# Patient Record
Sex: FEMALE | Race: WHITE | NOT HISPANIC OR LATINO | URBAN - METROPOLITAN AREA
[De-identification: names, ages, dates, MRNs, and addresses within clinical notes are randomized per-mention and may not be internally consistent; named-entity substitution may affect disease eponyms.]

---

## 2020-06-04 ENCOUNTER — INPATIENT (INPATIENT)
Facility: HOSPITAL | Age: 70
LOS: 3 days | Discharge: HOME | DRG: 175 | End: 2020-06-08
Attending: FAMILY MEDICINE | Admitting: INTERNAL MEDICINE
Payer: MEDICARE

## 2020-06-04 ENCOUNTER — EMERGENCY (EMERGENCY)
Facility: HOSPITAL | Age: 70
LOS: 1 days | Discharge: ANOTHER HOSPITAL | End: 2020-06-04
Attending: EMERGENCY MEDICINE | Admitting: EMERGENCY MEDICINE
Payer: MEDICARE

## 2020-06-04 VITALS
TEMPERATURE: 98 F | OXYGEN SATURATION: 92 % | WEIGHT: 184.97 LBS | RESPIRATION RATE: 18 BRPM | SYSTOLIC BLOOD PRESSURE: 172 MMHG | HEART RATE: 88 BPM | DIASTOLIC BLOOD PRESSURE: 97 MMHG

## 2020-06-04 VITALS
DIASTOLIC BLOOD PRESSURE: 76 MMHG | HEART RATE: 110 BPM | RESPIRATION RATE: 16 BRPM | OXYGEN SATURATION: 100 % | TEMPERATURE: 98 F | SYSTOLIC BLOOD PRESSURE: 120 MMHG | WEIGHT: 174.61 LBS

## 2020-06-04 VITALS
TEMPERATURE: 99 F | DIASTOLIC BLOOD PRESSURE: 88 MMHG | OXYGEN SATURATION: 95 % | RESPIRATION RATE: 18 BRPM | HEART RATE: 90 BPM | SYSTOLIC BLOOD PRESSURE: 133 MMHG

## 2020-06-04 DIAGNOSIS — I26.99 OTHER PULMONARY EMBOLISM WITHOUT ACUTE COR PULMONALE: ICD-10-CM

## 2020-06-04 DIAGNOSIS — Z90.49 ACQUIRED ABSENCE OF OTHER SPECIFIED PARTS OF DIGESTIVE TRACT: Chronic | ICD-10-CM

## 2020-06-04 DIAGNOSIS — I80.00 PHLEBITIS AND THROMBOPHLEBITIS OF SUPERFICIAL VESSELS OF UNSPECIFIED LOWER EXTREMITY: ICD-10-CM

## 2020-06-04 LAB
ALBUMIN SERPL ELPH-MCNC: 3.7 G/DL — SIGNIFICANT CHANGE UP (ref 3.3–5)
ALBUMIN SERPL ELPH-MCNC: 4.1 G/DL — SIGNIFICANT CHANGE UP (ref 3.3–5.2)
ALP SERPL-CCNC: 84 U/L — SIGNIFICANT CHANGE UP (ref 40–120)
ALP SERPL-CCNC: 91 U/L — SIGNIFICANT CHANGE UP (ref 40–120)
ALT FLD-CCNC: 102 U/L — HIGH (ref 12–78)
ALT FLD-CCNC: 87 U/L — HIGH
ANION GAP SERPL CALC-SCNC: 13 MMOL/L — SIGNIFICANT CHANGE UP (ref 5–17)
ANION GAP SERPL CALC-SCNC: 8 MMOL/L — SIGNIFICANT CHANGE UP (ref 5–17)
APTT BLD: 192.8 SEC — CRITICAL HIGH (ref 27.5–36.3)
APTT BLD: 28.2 SEC — LOW (ref 28.5–37)
APTT BLD: 95.3 SEC — HIGH (ref 27.5–36.3)
APTT BLD: > 200 SEC (ref 28.5–37)
AST SERPL-CCNC: 133 U/L — HIGH (ref 15–37)
AST SERPL-CCNC: 93 U/L — HIGH
BASOPHILS # BLD AUTO: 0.02 K/UL — SIGNIFICANT CHANGE UP (ref 0–0.2)
BASOPHILS # BLD AUTO: 0.04 K/UL — SIGNIFICANT CHANGE UP (ref 0–0.2)
BASOPHILS NFR BLD AUTO: 0.3 % — SIGNIFICANT CHANGE UP (ref 0–2)
BASOPHILS NFR BLD AUTO: 0.5 % — SIGNIFICANT CHANGE UP (ref 0–2)
BILIRUB SERPL-MCNC: 0.6 MG/DL — SIGNIFICANT CHANGE UP (ref 0.2–1.2)
BILIRUB SERPL-MCNC: 0.7 MG/DL — SIGNIFICANT CHANGE UP (ref 0.4–2)
BUN SERPL-MCNC: 11 MG/DL — SIGNIFICANT CHANGE UP (ref 7–23)
BUN SERPL-MCNC: 13 MG/DL — SIGNIFICANT CHANGE UP (ref 8–20)
CALCIUM SERPL-MCNC: 8.7 MG/DL — SIGNIFICANT CHANGE UP (ref 8.5–10.1)
CALCIUM SERPL-MCNC: 8.8 MG/DL — SIGNIFICANT CHANGE UP (ref 8.6–10.2)
CHLORIDE SERPL-SCNC: 107 MMOL/L — SIGNIFICANT CHANGE UP (ref 98–107)
CHLORIDE SERPL-SCNC: 110 MMOL/L — HIGH (ref 96–108)
CK MB BLD-MCNC: 4.9 % — HIGH (ref 0–3.5)
CK MB CFR SERPL CALC: 4.7 NG/ML — HIGH (ref 0–3.6)
CK SERPL-CCNC: 96 U/L — SIGNIFICANT CHANGE UP (ref 26–192)
CO2 SERPL-SCNC: 22 MMOL/L — SIGNIFICANT CHANGE UP (ref 22–29)
CO2 SERPL-SCNC: 25 MMOL/L — SIGNIFICANT CHANGE UP (ref 22–31)
CREAT SERPL-MCNC: 0.59 MG/DL — SIGNIFICANT CHANGE UP (ref 0.5–1.3)
CREAT SERPL-MCNC: 0.69 MG/DL — SIGNIFICANT CHANGE UP (ref 0.5–1.3)
EOSINOPHIL # BLD AUTO: 0.04 K/UL — SIGNIFICANT CHANGE UP (ref 0–0.5)
EOSINOPHIL # BLD AUTO: 0.04 K/UL — SIGNIFICANT CHANGE UP (ref 0–0.5)
EOSINOPHIL NFR BLD AUTO: 0.5 % — SIGNIFICANT CHANGE UP (ref 0–6)
EOSINOPHIL NFR BLD AUTO: 0.5 % — SIGNIFICANT CHANGE UP (ref 0–6)
GLUCOSE SERPL-MCNC: 113 MG/DL — HIGH (ref 70–99)
GLUCOSE SERPL-MCNC: 128 MG/DL — HIGH (ref 70–99)
HCT VFR BLD CALC: 42.5 % — SIGNIFICANT CHANGE UP (ref 34.5–45)
HCT VFR BLD CALC: 43.6 % — SIGNIFICANT CHANGE UP (ref 34.5–45)
HGB BLD-MCNC: 13.9 G/DL — SIGNIFICANT CHANGE UP (ref 11.5–15.5)
HGB BLD-MCNC: 14.3 G/DL — SIGNIFICANT CHANGE UP (ref 11.5–15.5)
IMM GRANULOCYTES NFR BLD AUTO: 0.3 % — SIGNIFICANT CHANGE UP (ref 0–1.5)
IMM GRANULOCYTES NFR BLD AUTO: 0.3 % — SIGNIFICANT CHANGE UP (ref 0–1.5)
INR BLD: 0.92 RATIO — SIGNIFICANT CHANGE UP (ref 0.88–1.16)
INR BLD: 1.03 RATIO — SIGNIFICANT CHANGE UP (ref 0.88–1.16)
INR BLD: 1.03 RATIO — SIGNIFICANT CHANGE UP (ref 0.88–1.16)
LYMPHOCYTES # BLD AUTO: 0.93 K/UL — LOW (ref 1–3.3)
LYMPHOCYTES # BLD AUTO: 1.88 K/UL — SIGNIFICANT CHANGE UP (ref 1–3.3)
LYMPHOCYTES # BLD AUTO: 10.7 % — LOW (ref 13–44)
LYMPHOCYTES # BLD AUTO: 24.9 % — SIGNIFICANT CHANGE UP (ref 13–44)
MCHC RBC-ENTMCNC: 32.1 PG — SIGNIFICANT CHANGE UP (ref 27–34)
MCHC RBC-ENTMCNC: 32.5 PG — SIGNIFICANT CHANGE UP (ref 27–34)
MCHC RBC-ENTMCNC: 32.7 GM/DL — SIGNIFICANT CHANGE UP (ref 32–36)
MCHC RBC-ENTMCNC: 32.8 GM/DL — SIGNIFICANT CHANGE UP (ref 32–36)
MCV RBC AUTO: 98 FL — SIGNIFICANT CHANGE UP (ref 80–100)
MCV RBC AUTO: 99.3 FL — SIGNIFICANT CHANGE UP (ref 80–100)
MONOCYTES # BLD AUTO: 0.39 K/UL — SIGNIFICANT CHANGE UP (ref 0–0.9)
MONOCYTES # BLD AUTO: 0.64 K/UL — SIGNIFICANT CHANGE UP (ref 0–0.9)
MONOCYTES NFR BLD AUTO: 4.5 % — SIGNIFICANT CHANGE UP (ref 2–14)
MONOCYTES NFR BLD AUTO: 8.5 % — SIGNIFICANT CHANGE UP (ref 2–14)
NEUTROPHILS # BLD AUTO: 4.94 K/UL — SIGNIFICANT CHANGE UP (ref 1.8–7.4)
NEUTROPHILS # BLD AUTO: 7.29 K/UL — SIGNIFICANT CHANGE UP (ref 1.8–7.4)
NEUTROPHILS NFR BLD AUTO: 65.5 % — SIGNIFICANT CHANGE UP (ref 43–77)
NEUTROPHILS NFR BLD AUTO: 83.5 % — HIGH (ref 43–77)
NRBC # BLD: 0 /100 WBCS — SIGNIFICANT CHANGE UP (ref 0–0)
PLATELET # BLD AUTO: 135 K/UL — LOW (ref 150–400)
PLATELET # BLD AUTO: 145 K/UL — LOW (ref 150–400)
POTASSIUM SERPL-MCNC: 4.4 MMOL/L — SIGNIFICANT CHANGE UP (ref 3.5–5.3)
POTASSIUM SERPL-MCNC: 4.6 MMOL/L — SIGNIFICANT CHANGE UP (ref 3.5–5.3)
POTASSIUM SERPL-SCNC: 4.4 MMOL/L — SIGNIFICANT CHANGE UP (ref 3.5–5.3)
POTASSIUM SERPL-SCNC: 4.6 MMOL/L — SIGNIFICANT CHANGE UP (ref 3.5–5.3)
PROT SERPL-MCNC: 6.7 G/DL — SIGNIFICANT CHANGE UP (ref 6.6–8.7)
PROT SERPL-MCNC: 7.4 G/DL — SIGNIFICANT CHANGE UP (ref 6–8.3)
PROTHROM AB SERPL-ACNC: 10.3 SEC — SIGNIFICANT CHANGE UP (ref 10–12.9)
PROTHROM AB SERPL-ACNC: 11.6 SEC — SIGNIFICANT CHANGE UP (ref 10–12.9)
PROTHROM AB SERPL-ACNC: 11.6 SEC — SIGNIFICANT CHANGE UP (ref 10–12.9)
RBC # BLD: 4.28 M/UL — SIGNIFICANT CHANGE UP (ref 3.8–5.2)
RBC # BLD: 4.45 M/UL — SIGNIFICANT CHANGE UP (ref 3.8–5.2)
RBC # FLD: 12.5 % — SIGNIFICANT CHANGE UP (ref 10.3–14.5)
RBC # FLD: 13.1 % — SIGNIFICANT CHANGE UP (ref 10.3–14.5)
SARS-COV-2 RNA SPEC QL NAA+PROBE: SIGNIFICANT CHANGE UP
SODIUM SERPL-SCNC: 142 MMOL/L — SIGNIFICANT CHANGE UP (ref 135–145)
SODIUM SERPL-SCNC: 143 MMOL/L — SIGNIFICANT CHANGE UP (ref 135–145)
TROPONIN I SERPL-MCNC: 0.51 NG/ML — HIGH (ref 0.01–0.04)
TROPONIN I SERPL-MCNC: 1.15 NG/ML — HIGH (ref 0.01–0.04)
TROPONIN T SERPL-MCNC: 0.15 NG/ML — HIGH (ref 0–0.06)
WBC # BLD: 7.54 K/UL — SIGNIFICANT CHANGE UP (ref 3.8–10.5)
WBC # BLD: 8.72 K/UL — SIGNIFICANT CHANGE UP (ref 3.8–10.5)
WBC # FLD AUTO: 7.54 K/UL — SIGNIFICANT CHANGE UP (ref 3.8–10.5)
WBC # FLD AUTO: 8.72 K/UL — SIGNIFICANT CHANGE UP (ref 3.8–10.5)

## 2020-06-04 PROCEDURE — 99291 CRITICAL CARE FIRST HOUR: CPT

## 2020-06-04 PROCEDURE — 36415 COLL VENOUS BLD VENIPUNCTURE: CPT

## 2020-06-04 PROCEDURE — 71045 X-RAY EXAM CHEST 1 VIEW: CPT

## 2020-06-04 PROCEDURE — 96374 THER/PROPH/DIAG INJ IV PUSH: CPT | Mod: XU

## 2020-06-04 PROCEDURE — 74175 CTA ABDOMEN W/CONTRAST: CPT | Mod: 26

## 2020-06-04 PROCEDURE — 93306 TTE W/DOPPLER COMPLETE: CPT | Mod: 26

## 2020-06-04 PROCEDURE — 85027 COMPLETE CBC AUTOMATED: CPT

## 2020-06-04 PROCEDURE — 93010 ELECTROCARDIOGRAM REPORT: CPT | Mod: 77

## 2020-06-04 PROCEDURE — 83735 ASSAY OF MAGNESIUM: CPT

## 2020-06-04 PROCEDURE — 99291 CRITICAL CARE FIRST HOUR: CPT | Mod: 25

## 2020-06-04 PROCEDURE — 74175 CTA ABDOMEN W/CONTRAST: CPT

## 2020-06-04 PROCEDURE — 85730 THROMBOPLASTIN TIME PARTIAL: CPT

## 2020-06-04 PROCEDURE — 84100 ASSAY OF PHOSPHORUS: CPT

## 2020-06-04 PROCEDURE — 82553 CREATINE MB FRACTION: CPT

## 2020-06-04 PROCEDURE — 70450 CT HEAD/BRAIN W/O DYE: CPT | Mod: 26

## 2020-06-04 PROCEDURE — 82550 ASSAY OF CK (CPK): CPT

## 2020-06-04 PROCEDURE — 74174 CTA ABD&PLVS W/CONTRAST: CPT

## 2020-06-04 PROCEDURE — 80053 COMPREHEN METABOLIC PANEL: CPT

## 2020-06-04 PROCEDURE — 93970 EXTREMITY STUDY: CPT | Mod: 26

## 2020-06-04 PROCEDURE — 87635 SARS-COV-2 COVID-19 AMP PRB: CPT

## 2020-06-04 PROCEDURE — 82962 GLUCOSE BLOOD TEST: CPT

## 2020-06-04 PROCEDURE — 93306 TTE W/DOPPLER COMPLETE: CPT

## 2020-06-04 PROCEDURE — 96361 HYDRATE IV INFUSION ADD-ON: CPT

## 2020-06-04 PROCEDURE — 71275 CT ANGIOGRAPHY CHEST: CPT

## 2020-06-04 PROCEDURE — 71275 CT ANGIOGRAPHY CHEST: CPT | Mod: 26

## 2020-06-04 PROCEDURE — 99291 CRITICAL CARE FIRST HOUR: CPT | Mod: CS,GC

## 2020-06-04 PROCEDURE — 70450 CT HEAD/BRAIN W/O DYE: CPT

## 2020-06-04 PROCEDURE — 93970 EXTREMITY STUDY: CPT

## 2020-06-04 PROCEDURE — 83690 ASSAY OF LIPASE: CPT

## 2020-06-04 PROCEDURE — 96375 TX/PRO/DX INJ NEW DRUG ADDON: CPT

## 2020-06-04 PROCEDURE — 93005 ELECTROCARDIOGRAM TRACING: CPT

## 2020-06-04 PROCEDURE — 84484 ASSAY OF TROPONIN QUANT: CPT

## 2020-06-04 PROCEDURE — 71045 X-RAY EXAM CHEST 1 VIEW: CPT | Mod: 26

## 2020-06-04 PROCEDURE — 99292 CRITICAL CARE ADDL 30 MIN: CPT

## 2020-06-04 PROCEDURE — 85610 PROTHROMBIN TIME: CPT

## 2020-06-04 PROCEDURE — 99283 EMERGENCY DEPT VISIT LOW MDM: CPT

## 2020-06-04 PROCEDURE — 93010 ELECTROCARDIOGRAM REPORT: CPT

## 2020-06-04 RX ORDER — HEPARIN SODIUM 5000 [USP'U]/ML
6500 INJECTION INTRAVENOUS; SUBCUTANEOUS ONCE
Refills: 0 | Status: DISCONTINUED | OUTPATIENT
Start: 2020-06-04 | End: 2020-06-04

## 2020-06-04 RX ORDER — ERGOCALCIFEROL 1.25 MG/1
1 CAPSULE ORAL
Qty: 0 | Refills: 0 | DISCHARGE

## 2020-06-04 RX ORDER — OMEGA-3 ACID ETHYL ESTERS 1 G
1 CAPSULE ORAL
Qty: 0 | Refills: 0 | DISCHARGE

## 2020-06-04 RX ORDER — HEPARIN SODIUM 5000 [USP'U]/ML
3000 INJECTION INTRAVENOUS; SUBCUTANEOUS EVERY 6 HOURS
Refills: 0 | Status: DISCONTINUED | OUTPATIENT
Start: 2020-06-04 | End: 2020-06-04

## 2020-06-04 RX ORDER — ENOXAPARIN SODIUM 100 MG/ML
80 INJECTION SUBCUTANEOUS ONCE
Refills: 0 | Status: DISCONTINUED | OUTPATIENT
Start: 2020-06-04 | End: 2020-06-04

## 2020-06-04 RX ORDER — HEPARIN SODIUM 5000 [USP'U]/ML
INJECTION INTRAVENOUS; SUBCUTANEOUS
Qty: 25000 | Refills: 0 | Status: DISCONTINUED | OUTPATIENT
Start: 2020-06-04 | End: 2020-06-04

## 2020-06-04 RX ORDER — BIMATOPROST 0.3 MG/ML
1 SOLUTION/ DROPS OPHTHALMIC
Qty: 0 | Refills: 0 | DISCHARGE

## 2020-06-04 RX ORDER — HEPARIN SODIUM 5000 [USP'U]/ML
6500 INJECTION INTRAVENOUS; SUBCUTANEOUS EVERY 6 HOURS
Refills: 0 | Status: DISCONTINUED | OUTPATIENT
Start: 2020-06-04 | End: 2020-06-05

## 2020-06-04 RX ORDER — HEPARIN SODIUM 5000 [USP'U]/ML
6500 INJECTION INTRAVENOUS; SUBCUTANEOUS EVERY 6 HOURS
Refills: 0 | Status: DISCONTINUED | OUTPATIENT
Start: 2020-06-04 | End: 2020-06-08

## 2020-06-04 RX ORDER — DORZOLAMIDE HYDROCHLORIDE 20 MG/ML
1 SOLUTION/ DROPS OPHTHALMIC
Qty: 0 | Refills: 0 | DISCHARGE

## 2020-06-04 RX ORDER — CHLORHEXIDINE GLUCONATE 213 G/1000ML
1 SOLUTION TOPICAL
Refills: 0 | Status: DISCONTINUED | OUTPATIENT
Start: 2020-06-04 | End: 2020-06-08

## 2020-06-04 RX ORDER — ACETAMINOPHEN 500 MG
650 TABLET ORAL EVERY 6 HOURS
Refills: 0 | Status: DISCONTINUED | OUTPATIENT
Start: 2020-06-04 | End: 2020-06-08

## 2020-06-04 RX ORDER — DORZOLAMIDE HYDROCHLORIDE 20 MG/ML
1 SOLUTION/ DROPS OPHTHALMIC
Refills: 0 | Status: DISCONTINUED | OUTPATIENT
Start: 2020-06-05 | End: 2020-06-08

## 2020-06-04 RX ORDER — HEPARIN SODIUM 5000 [USP'U]/ML
INJECTION INTRAVENOUS; SUBCUTANEOUS
Qty: 25000 | Refills: 0 | Status: DISCONTINUED | OUTPATIENT
Start: 2020-06-04 | End: 2020-06-08

## 2020-06-04 RX ORDER — DORZOLAMIDE HYDROCHLORIDE TIMOLOL MALEATE 20; 5 MG/ML; MG/ML
1 SOLUTION/ DROPS OPHTHALMIC
Qty: 0 | Refills: 0 | DISCHARGE

## 2020-06-04 RX ORDER — AMLODIPINE BESYLATE 2.5 MG/1
1 TABLET ORAL
Qty: 0 | Refills: 0 | DISCHARGE

## 2020-06-04 RX ORDER — ROSUVASTATIN CALCIUM 5 MG/1
1 TABLET ORAL
Qty: 0 | Refills: 0 | DISCHARGE

## 2020-06-04 RX ORDER — SODIUM CHLORIDE 9 MG/ML
1000 INJECTION INTRAMUSCULAR; INTRAVENOUS; SUBCUTANEOUS ONCE
Refills: 0 | Status: COMPLETED | OUTPATIENT
Start: 2020-06-04 | End: 2020-06-04

## 2020-06-04 RX ORDER — HEPARIN SODIUM 5000 [USP'U]/ML
1100 INJECTION INTRAVENOUS; SUBCUTANEOUS
Qty: 25000 | Refills: 0 | Status: DISCONTINUED | OUTPATIENT
Start: 2020-06-04 | End: 2020-06-05

## 2020-06-04 RX ORDER — PREGABALIN 225 MG/1
1 CAPSULE ORAL
Qty: 0 | Refills: 0 | DISCHARGE

## 2020-06-04 RX ORDER — HEPARIN SODIUM 5000 [USP'U]/ML
3000 INJECTION INTRAVENOUS; SUBCUTANEOUS EVERY 6 HOURS
Refills: 0 | Status: DISCONTINUED | OUTPATIENT
Start: 2020-06-04 | End: 2020-06-08

## 2020-06-04 RX ORDER — LATANOPROST 0.05 MG/ML
1 SOLUTION/ DROPS OPHTHALMIC; TOPICAL AT BEDTIME
Refills: 0 | Status: DISCONTINUED | OUTPATIENT
Start: 2020-06-04 | End: 2020-06-08

## 2020-06-04 RX ORDER — ATORVASTATIN CALCIUM 80 MG/1
20 TABLET, FILM COATED ORAL AT BEDTIME
Refills: 0 | Status: DISCONTINUED | OUTPATIENT
Start: 2020-06-04 | End: 2020-06-08

## 2020-06-04 RX ORDER — PANTOPRAZOLE SODIUM 20 MG/1
40 TABLET, DELAYED RELEASE ORAL DAILY
Refills: 0 | Status: DISCONTINUED | OUTPATIENT
Start: 2020-06-04 | End: 2020-06-05

## 2020-06-04 RX ORDER — UBIDECARENONE 100 MG
100 CAPSULE ORAL
Qty: 0 | Refills: 0 | DISCHARGE

## 2020-06-04 RX ORDER — HEPARIN SODIUM 5000 [USP'U]/ML
6500 INJECTION INTRAVENOUS; SUBCUTANEOUS ONCE
Refills: 0 | Status: COMPLETED | OUTPATIENT
Start: 2020-06-04 | End: 2020-06-04

## 2020-06-04 RX ORDER — HEPARIN SODIUM 5000 [USP'U]/ML
6500 INJECTION INTRAVENOUS; SUBCUTANEOUS EVERY 6 HOURS
Refills: 0 | Status: DISCONTINUED | OUTPATIENT
Start: 2020-06-04 | End: 2020-06-04

## 2020-06-04 RX ORDER — ONDANSETRON 8 MG/1
4 TABLET, FILM COATED ORAL ONCE
Refills: 0 | Status: COMPLETED | OUTPATIENT
Start: 2020-06-04 | End: 2020-06-04

## 2020-06-04 RX ORDER — HEPARIN SODIUM 5000 [USP'U]/ML
3000 INJECTION INTRAVENOUS; SUBCUTANEOUS EVERY 6 HOURS
Refills: 0 | Status: DISCONTINUED | OUTPATIENT
Start: 2020-06-04 | End: 2020-06-05

## 2020-06-04 RX ADMIN — SODIUM CHLORIDE 1000 MILLILITER(S): 9 INJECTION INTRAMUSCULAR; INTRAVENOUS; SUBCUTANEOUS at 09:21

## 2020-06-04 RX ADMIN — Medication 650 MILLIGRAM(S): at 22:44

## 2020-06-04 RX ADMIN — HEPARIN SODIUM 1400 UNIT(S)/HR: 5000 INJECTION INTRAVENOUS; SUBCUTANEOUS at 12:56

## 2020-06-04 RX ADMIN — ATORVASTATIN CALCIUM 20 MILLIGRAM(S): 80 TABLET, FILM COATED ORAL at 22:44

## 2020-06-04 RX ADMIN — HEPARIN SODIUM 5000 UNIT(S): 5000 INJECTION INTRAVENOUS; SUBCUTANEOUS at 12:54

## 2020-06-04 RX ADMIN — SODIUM CHLORIDE 1000 MILLILITER(S): 9 INJECTION INTRAMUSCULAR; INTRAVENOUS; SUBCUTANEOUS at 11:41

## 2020-06-04 RX ADMIN — HEPARIN SODIUM 1100 UNIT(S)/HR: 5000 INJECTION INTRAVENOUS; SUBCUTANEOUS at 22:54

## 2020-06-04 RX ADMIN — ONDANSETRON 4 MILLIGRAM(S): 8 TABLET, FILM COATED ORAL at 09:21

## 2020-06-04 NOTE — CONSULT NOTE ADULT - SUBJECTIVE AND OBJECTIVE BOX
[facility]         Akash Chapin, Edenilson, Erin, Enedina, Ish Mas        [phone] (office)    CHIEF COMPLAINT: Patient is a 69y old  Female who presents with a chief complaint of     HPI:  70 y/o F with HTN, HLD, "irregular heart beat" presents to ED s/p syncopal episode. Patient reports she went to put her garbage out at 530 am when she suddenly felt dizzy like she was spinning and fell to the ground. Patient woke up on the floor, unknown down time. States upon waking up she felt weak, walked into her house and laid on the couch a while. Her  took her BP and ti was low 90's/50s. She then proceeded to her bedroom with assistance from her  when she almost passed out again. She then got in the car to go to [facility] [place] but felt as if she was going to pass out again while on the way and called and ambulance and came to this ED. Patient denies chest pain, SOB, fever, chills. Reports nausea, dizziness (like she was spinning), shakiness, and weakness as well as a frotnal headache which now resolved. Also had mid upper back pain "between the shoulder blades" and tingling to L arm which both have now resvoled. States she was in her usual state of health prior to this AM.   in ED. Pt took 4 baby ASA prior to coming to ED.        PAST MEDICAL & SURGICAL HISTORY:  HLD (hyperlipidemia)  HTN (hypertension)  History of bowel resection      SOCIAL HISTORY: Past smoker, 20 pack years. Etoh abuse, daily drinker, 1 bottle of wine a night    FAMILY HISTORY:      Home Medications:      MEDICATIONS  (STANDING):    MEDICATIONS  (PRN):      Allergies    No Known Allergies    Intolerances        REVIEW OF SYSTEMS: Is negative for eye, ENT, GI, , allergic, dermatologic, musculoskeletal and neurologic, except as described above.    VITAL SIGNS:   Vital Signs Last 24 Hrs  T(C): 36.4 (04 Jun 2020 08:23), Max: 36.4 (04 Jun 2020 08:23)  T(F): 97.5 (04 Jun 2020 08:23), Max: 97.5 (04 Jun 2020 08:23)  HR: 102 (04 Jun 2020 09:24) (102 - 110)  BP: 100/63 (04 Jun 2020 09:24) (100/63 - 120/76)  BP(mean): --  RR: 16 (04 Jun 2020 09:24) (16 - 17)  SpO2: 99% (04 Jun 2020 09:00) (99% - 100%)    I&O's Summary      PHYSICAL EXAM:  Constitutional: NAD, awake and alert, well-developed  Eyes: EOMI, no oral cyanosis, conjunctivae clear, anicteric  Pulmonary: Non-labored, breath sounds are clear bilaterally, no wheezing, rales or rhonchi  Cardiovascular: RRR, No murmur, rubs, gallops or clicks  Gastrointestinal: Bowel Sounds present, soft, nontender  Lymph: No peripheral edema   Neurological: Alert, strength and sensitivity are grossly intact  Skin: Warm, well-perfused  Psych: Mood & affect appropriate    LABS: All Labs Reviewed:                        14.3   8.72  )-----------( 135      ( 04 Jun 2020 09:06 )             43.6     04 Jun 2020 09:06    143    |  110    |  11     ----------------------------<  128    4.4     |  25     |  0.69     Ca    8.7        04 Jun 2020 09:06    TPro  7.4    /  Alb  3.7    /  TBili  0.6    /  DBili  x      /  AST  133    /  ALT  102    /  AlkPhos  91     04 Jun 2020 09:06    PT/INR - ( 04 Jun 2020 09:06 )   PT: 10.3 sec;   INR: 0.92 ratio         PTT - ( 04 Jun 2020 09:06 )  PTT:28.2 sec  CARDIAC MARKERS ( 04 Jun 2020 09:06 )  .511 ng/mL / x     / x     / x     / x          Blood Culture:         RADIOLOGY:  cxr negative    EKG: [facility]         Akash Chapin, Edenilson, Erin, Enedina, Ish Mas        [phone] (office)    CHIEF COMPLAINT: Patient is a 69y old  Female who presents with a chief complaint of     HPI:  68 y/o F with HTN, HLD, "irregular heart beat" presents to ED s/p syncopal episode. Patient reports she went to put her garbage out at 530 am when she suddenly felt dizzy like she was spinning and fell to the ground. Patient woke up on the floor, unknown down time. States upon waking up she felt weak, walked into her house and laid on the couch a while. Her  took her BP and ti was low 90's/50s. She then proceeded to her bedroom with assistance from her  when she almost passed out again. She then got in the car to go [facility] but felt as if she was going to pass out again while on the way and called and ambulance and came to this ED. Patient denies chest pain, SOB, fever, chills. Reports nausea, dizziness (like she was spinning), shakiness, and weakness as well as a frotnal headache which now resolved. Also had mid upper back pain "between the shoulder blades" and tingling to L arm which both have now resvoled. States she was in her usual state of health prior to this AM.   in ED. Pt took 4 baby ASA prior to coming to ED.        PAST MEDICAL & SURGICAL HISTORY:  HLD (hyperlipidemia)  HTN (hypertension)  History of bowel resection      SOCIAL HISTORY: Past smoker, 20 pack years. Etoh abuse, daily drinker, 1 bottle of wine a night    FAMILY HISTORY:  dvt/arrest in mother.    Home Medications:      MEDICATIONS  (STANDING):    MEDICATIONS  (PRN):      Allergies    No Known Allergies    Intolerances        REVIEW OF SYSTEMS: Is negative for eye, ENT, GI, , allergic, dermatologic, musculoskeletal and neurologic, except as described above.    VITAL SIGNS:   Vital Signs Last 24 Hrs  T(C): 36.4 (04 Jun 2020 08:23), Max: 36.4 (04 Jun 2020 08:23)  T(F): 97.5 (04 Jun 2020 08:23), Max: 97.5 (04 Jun 2020 08:23)  HR: 102 (04 Jun 2020 09:24) (102 - 110)  BP: 100/63 (04 Jun 2020 09:24) (100/63 - 120/76)  BP(mean): --  RR: 16 (04 Jun 2020 09:24) (16 - 17)  SpO2: 99% (04 Jun 2020 09:00) (99% - 100%)    I&O's Summary      PHYSICAL EXAM:  Constitutional: NAD, awake and alert, well-developed  Eyes: EOMI, no oral cyanosis, conjunctivae clear, anicteric  Pulmonary: Non-labored, breath sounds are clear bilaterally, no wheezing, rales or rhonchi  Cardiovascular: RRR, No murmur, rubs, gallops or clicks  Gastrointestinal: Bowel Sounds present, soft, nontender  Lymph: No peripheral edema   Neurological: Alert, strength and sensitivity are grossly intact  Skin: Warm, well-perfused  Psych: Mood & affect appropriate    LABS: All Labs Reviewed:                        14.3   8.72  )-----------( 135      ( 04 Jun 2020 09:06 )             43.6     04 Jun 2020 09:06    143    |  110    |  11     ----------------------------<  128    4.4     |  25     |  0.69     Ca    8.7        04 Jun 2020 09:06    TPro  7.4    /  Alb  3.7    /  TBili  0.6    /  DBili  x      /  AST  133    /  ALT  102    /  AlkPhos  91     04 Jun 2020 09:06    PT/INR - ( 04 Jun 2020 09:06 )   PT: 10.3 sec;   INR: 0.92 ratio         PTT - ( 04 Jun 2020 09:06 )  PTT:28.2 sec  CARDIAC MARKERS ( 04 Jun 2020 09:06 )  .511 ng/mL / x     / x     / x     / x          Blood Culture:         RADIOLOGY:  cxr negative    EKG:

## 2020-06-04 NOTE — ED PROVIDER NOTE - PROGRESS NOTE DETAILS
CRITICAL VALUE received from Radiology Dr Harley at 1135, PE in main pulm artery  and R pulm artery. ICU team consulted and will be down to see pt shortly. Lovenox ordered. Pt  and Dr Romero made aware of results. Pt mildly hypotensive and hypoxic (94% on room air) with uptrendding troponin. Case d/w dr Deng and Dr Nuno R heart strian on echo, concern for possible decomposition despite stable condition at this time. Pt to be transfered to [facility] for possible catheter assisted therapy. Patient aware and agrees with plan. Transferred center called to discuss transfer. Spoke with Nicholas from transfer Center and Dr Thad Pal in [facility] ED at 1743. Pt accepting physician is Dr Saenz

## 2020-06-04 NOTE — ED ADULT NURSE NOTE - CHIEF COMPLAINT QUOTE
Patient A&Ox4, transfer from [facility] for saddle PE, has heparin drip in progress, negative obvious gross bleeding. Dr. Pal met patient at bedside, patient placed in critical care.

## 2020-06-04 NOTE — ED ADULT NURSE NOTE - OBJECTIVE STATEMENT
Pt received in critical care as transfer from [facility] for saddle PE, AOx3, NSR noted on monitor, respirations even and unlabored on 4L NC, no apparent distress noted at this time. Pt states this morning she was outside on her driveway and experienced a syncopal episode of unknown downtime, found by her son and , and then a second syncopal episode in car en route to [facility]. States hx of 2 falls/syncopal episodes in May with c/o back pain. Pt diagnosed with saddle PE at [facility] and multiple DVTs in left leg. Pt tested Covid-19 Negative at [facility] prior to transfer. Pt denies any complaints at this time, pt educated on plan of care, pt able to successfully teach back plan of care to RN, RN will continue to reeducate pt during hospital stay.

## 2020-06-04 NOTE — ED ADULT NURSE REASSESSMENT NOTE - NSIMPLEMENTINTERV_GEN_ALL_ED
Implemented All Fall with Harm Risk Interventions:  [place] to call system. Call bell, personal items and telephone within reach. Instruct patient to call for assistance. Room bathroom lighting operational. Non-slip footwear when patient is off stretcher. Physically safe environment: no spills, clutter or unnecessary equipment. Stretcher in lowest position, wheels locked, appropriate side rails in place. Provide visual cue, wrist band, yellow gown, etc. Monitor gait and stability. Monitor for mental status changes and reorient to person, place, and time. Review medications for side effects contributing to fall risk. Reinforce activity limits and safety measures with patient and family. Provide visual clues: red socks.

## 2020-06-04 NOTE — ED PROVIDER NOTE - CARE PLAN
Principal Discharge DX:	Acute pulmonary embolism, unspecified pulmonary embolism type, unspecified whether acute cor pulmonale present  Secondary Diagnosis:	Elevated troponin

## 2020-06-04 NOTE — CONSULT NOTE ADULT - ASSESSMENT
This is a 70 y/o F with HTN, HLD, "irregular heart beat" who presents to ED s/p multiple syncopal episodes. Hx and presentation sounds orthostatic/vagal in nature vs arrythmia.    Recommend:    - Check orthostatics  - telemetry monitoring  - Pt reports recent echo/stress this past year was wnl, repeat TTE here  - No clear evidence of acute ischemia, EKG sinus tach with PACs, trops x1 mildly positive, but can be explained in setting of prolonged hypotension. Trend Osvaldo  - No evidence of volume overload.  - BP soft, hold anti-hypertensives, monitor hemodynamics.  - continue statin  - start asa 81 daily tomorrow  - Will eventually need ischemic workup  - Monitor and replete lytes, keep K>4, Mg>2.  - Other cardiovascular workup will depend on clinical course.  - All other workup per primary team.  - Will follow. This is a 68 y/o F with HTN, HLD, "irregular heart beat" who presents to ED s/p multiple syncopal episodes. Found with submassive PE on CT likely cause of pt's syncope vs arrythmia.    Recommend:    - Start full dose heparin  - telemetry monitoring  - Check TTE, d/l dopplers  - Trops x1 elevated 2/2 to right heart strain. Trend Osvaldo  - No evidence of volume overload.  - BP soft, hold anti-hypertensives, monitor hemodynamics.  - continue statin  - start asa 81 daily tomorrow  - Monitor and replete lytes, keep K>4, Mg>2.  - Other cardiovascular workup will depend on clinical course.  - All other workup per primary team.  - Will follow. This is a 70 y/o F with HTN, HLD, "irregular heart beat" who presents to ED s/p multiple syncopal episodes. Found with submassive PE on CT likely cause of pt's syncope.    Recommend:    - Start full dose heparin  - telemetry monitoring  - Check TTE, d/l dopplers  - Trops x1 elevated 2/2 to right heart strain. Trend Osvaldo  - No evidence of volume overload.  - BP soft, hold anti-hypertensives, monitor hemodynamics. Can give ivf  - continue statin  - Monitor and replete lytes, keep K>4, Mg>2.  - Other cardiovascular workup will depend on clinical course.  - All other workup per primary team.  - Will follow.

## 2020-06-04 NOTE — PROGRESS NOTE ADULT - SUBJECTIVE AND OBJECTIVE BOX
REASON FOR CONSULT: b/l PE    CONSULT REQUESTED BY:  ER MD    Patient is a 69y old  Female Pmhx HTN, HLD, and bowel resection presents to ED w/ syncopal episode early this morning and progressive RIVERA for 1 month. Pt admits to mechanical fall 2 wks ago causing swelling and bruising left Lower extremity.     BRIEF HOSPITAL COURSE: ***    Events last 24 hours: ***    PAST MEDICAL & SURGICAL HISTORY:  HLD (hyperlipidemia)  HTN (hypertension)  History of bowel resection    Allergies    No Known Allergies    Intolerances      FAMILY HISTORY:      Review of Systems:  CONSTITUTIONAL: No fever, chills, or fatigue  EYES: No eye pain, visual disturbances, or discharge  ENMT:  No difficulty hearing, tinnitus, vertigo; No sinus or throat pain  NECK: No pain or stiffness  RESPIRATORY: No cough, wheezing, chills or hemoptysis; No shortness of breath  CARDIOVASCULAR: No chest pain, palpitations, dizziness, or leg swelling  GASTROINTESTINAL: No abdominal or epigastric pain. No nausea, vomiting, or hematemesis; No diarrhea or constipation. No melena or hematochezia.  GENITOURINARY: No dysuria, frequency, hematuria, or incontinence  NEUROLOGICAL: No headaches, memory loss, loss of strength, numbness, or tremors  SKIN: No itching, burning, rashes, or lesions   MUSCULOSKELETAL: No joint pain or swelling; No muscle, back, or extremity pain  PSYCHIATRIC: No depression, anxiety, mood swings, or difficulty sleeping      Medications:            heparin   Injectable 6500 Unit(s) IV Push every 6 hours PRN  heparin   Injectable 3000 Unit(s) IV Push every 6 hours PRN  heparin  Infusion.  Unit(s)/Hr IV Continuous <Continuous>                        ICU Vital Signs Last 24 Hrs  T(C): 36.8 (04 Jun 2020 11:55), Max: 36.8 (04 Jun 2020 11:55)  T(F): 98.2 (04 Jun 2020 11:55), Max: 98.2 (04 Jun 2020 11:55)  HR: 105 (04 Jun 2020 12:15) (102 - 112)  BP: 99/62 (04 Jun 2020 12:15) (99/62 - 120/76)  BP(mean): --  ABP: --  ABP(mean): --  RR: 18 (04 Jun 2020 12:17) (16 - 18)  SpO2: 94% (04 Jun 2020 12:17) (90% - 100%)    Vital Signs Last 24 Hrs  T(C): 36.8 (04 Jun 2020 11:55), Max: 36.8 (04 Jun 2020 11:55)  T(F): 98.2 (04 Jun 2020 11:55), Max: 98.2 (04 Jun 2020 11:55)  HR: 105 (04 Jun 2020 12:15) (102 - 112)  BP: 99/62 (04 Jun 2020 12:15) (99/62 - 120/76)  BP(mean): --  RR: 18 (04 Jun 2020 12:17) (16 - 18)  SpO2: 94% (04 Jun 2020 12:17) (90% - 100%)        I&O's Detail        LABS:                        14.3   8.72  )-----------( 135      ( 04 Jun 2020 09:06 )             43.6     06-04    143  |  110<H>  |  11  ----------------------------<  128<H>  4.4   |  25  |  0.69    Ca    8.7      04 Jun 2020 09:06  Phos  3.7     06-04  Mg     2.0     06-04    TPro  7.4  /  Alb  3.7  /  TBili  0.6  /  DBili  x   /  AST  133<H>  /  ALT  102<H>  /  AlkPhos  91  06-04      CARDIAC MARKERS ( 04 Jun 2020 09:06 )  .511 ng/mL / x     / 82 U/L / x     / 2.1 ng/mL      CAPILLARY BLOOD GLUCOSE      POCT Blood Glucose.: 133 mg/dL (04 Jun 2020 08:50)    PT/INR - ( 04 Jun 2020 09:06 )   PT: 10.3 sec;   INR: 0.92 ratio         PTT - ( 04 Jun 2020 09:06 )  PTT:28.2 sec    CULTURES:      Physical Examination:    General: No acute distress.  Alert, oriented, interactive, nonfocal    HEENT: Pupils equal, reactive to light.  Symmetric.    PULM: Clear to auscultation bilaterally, no significant sputum production    CVS: Regular rate and rhythm, no murmurs, rubs, or gallops    ABD: Soft, nondistended, nontender, normoactive bowel sounds, no masses    EXT: No edema, nontender    SKIN: Warm and well perfused, no rashes noted.    RADIOLOGY: ***    CRITICAL CARE TIME SPENT: *** REASON FOR CONSULT: b/l PE    CONSULT REQUESTED BY:  ER MD    Patient is a 69y old  Female Pmhx HTN, HLD, and bowel resection presents to ED w/ syncopal episode early this morning and progressive RIVERA for 1 month. Pt admits to mechanical fall 2 wks ago causing swelling and bruising left Lower extremity. Denies long travels, fever, chills chest. Pt admits to mid back pain  b/w shoulders this am which now resolved. CTA chest show b/l PE's w/ rt heart strain.          PAST MEDICAL & SURGICAL HISTORY:  HLD (hyperlipidemia)  HTN (hypertension)  History of bowel resection    Allergies    No Known Allergies    Intolerances      FAMILY HISTORY:      Review of Systems:  CONSTITUTIONAL: + fever, chills, or fatigue  EYES: No eye pain, visual disturbances, or discharge  ENMT:  No difficulty hearing, tinnitus, vertigo; No sinus or throat pain  NECK: No pain or stiffness  RESPIRATORY: No cough, wheezing, chills or hemoptysis; No shortness of breath +RIVERA  CARDIOVASCULAR: No chest pain, palpitations, dizziness, or leg swelling  GASTROINTESTINAL: No abdominal or epigastric pain. No nausea, vomiting, or hematemesis; No diarrhea or constipation. No melena or hematochezia.  GENITOURINARY: No dysuria, frequency, hematuria, or incontinence  NEUROLOGICAL: No headaches, memory loss, loss of strength, numbness, or tremors  SKIN: No itching, burning, rashes, or lesions   MUSCULOSKELETAL: No joint pain or swelling; No muscle, back, or extremity pain  PSYCHIATRIC: No depression, anxiety, mood swings, or difficulty sleeping      Medications:            heparin   Injectable 6500 Unit(s) IV Push every 6 hours PRN  heparin   Injectable 3000 Unit(s) IV Push every 6 hours PRN  heparin  Infusion.  Unit(s)/Hr IV Continuous <Continuous>                        ICU Vital Signs Last 24 Hrs  T(C): 36.8 (04 Jun 2020 11:55), Max: 36.8 (04 Jun 2020 11:55)  T(F): 98.2 (04 Jun 2020 11:55), Max: 98.2 (04 Jun 2020 11:55)  HR: 105 (04 Jun 2020 12:15) (102 - 112)  BP: 99/62 (04 Jun 2020 12:15) (99/62 - 120/76)  BP(mean): --  ABP: --  ABP(mean): --  RR: 18 (04 Jun 2020 12:17) (16 - 18)  SpO2: 94% (04 Jun 2020 12:17) (90% - 100%)    Vital Signs Last 24 Hrs  T(C): 36.8 (04 Jun 2020 11:55), Max: 36.8 (04 Jun 2020 11:55)  T(F): 98.2 (04 Jun 2020 11:55), Max: 98.2 (04 Jun 2020 11:55)  HR: 105 (04 Jun 2020 12:15) (102 - 112)  BP: 99/62 (04 Jun 2020 12:15) (99/62 - 120/76)  BP(mean): --  RR: 18 (04 Jun 2020 12:17) (16 - 18)  SpO2: 94% (04 Jun 2020 12:17) (90% - 100%)        I&O's Detail        LABS:                        14.3   8.72  )-----------( 135      ( 04 Jun 2020 09:06 )             43.6     06-04    143  |  110<H>  |  11  ----------------------------<  128<H>  4.4   |  25  |  0.69    Ca    8.7      04 Jun 2020 09:06  Phos  3.7     06-04  Mg     2.0     06-04    TPro  7.4  /  Alb  3.7  /  TBili  0.6  /  DBili  x   /  AST  133<H>  /  ALT  102<H>  /  AlkPhos  91  06-04      CARDIAC MARKERS ( 04 Jun 2020 09:06 )  .511 ng/mL / x     / 82 U/L / x     / 2.1 ng/mL      CAPILLARY BLOOD GLUCOSE      POCT Blood Glucose.: 133 mg/dL (04 Jun 2020 08:50)    PT/INR - ( 04 Jun 2020 09:06 )   PT: 10.3 sec;   INR: 0.92 ratio         PTT - ( 04 Jun 2020 09:06 )  PTT:28.2 sec    CULTURES:      Physical Examination:    General: No acute distress.  Alert, awake    HEENT: Pupils equal, reactive to light.  Symmetric    Lungs CTA b/l     CVS: Regular rate and rhythm, no murmurs, rubs, or gallops    ABD: Soft, nondistended, nontender, normoactive bowel sounds, no masses    EXT: No edema, nontender    SKIN: Warm and well perfused, no rashes noted.    RADIOLOGY: CTA Chest >  IMPRESSION:   Large acute pulmonary embolus main and right pulmonary artery as described. Suggestion of right ventricular strain.  No evidence of aortic dissection  Additional findings as discussed    Critical value discussed with NP Luna prior to this dictation    A/P 69y old  Female Pmhx HTN, HLD, and bowel resection presents to ED w/ syncopal episode early this morning and progressive RIVERA for 1 month adm w/ b/l PE. Pt hemdodynamically stable. No hypoxia  No indication for ICU. Stable for Tele. 32 min.     Discussed w/ Dr Jalloh and agrees with plan.    32 min Encounter

## 2020-06-04 NOTE — ED PROVIDER NOTE - ATTENDING CONTRIBUTION TO CARE
70 yo female hx of htn, hld, s/p several episodes of syncope, 1st episode 5:30am, +LOC, woke up on floor unable to take herself to hospital herself so called ambulance.  Feels chest tightness.  +slight upper mid back pain with tingling to L arm. No sob, no fever/chills. no leg swelling.      Gen: Alert, NAD  Head/eyes: NC/AT, PERRL, EOMI  ENT: airway patent  Neck: supple, no tenderness/meningismus/JVD, Trachea midline  Pulm/lung: Bilateral clear BS, normal resp effort, no wheeze/stridor/retractions  CV/heart: RRR, no M/R/G, +2 dist pulses (radial, pedal DP/PT, popliteal)  GI/Abd: soft, NT/ND, +BS, no guarding/rebound tenderness  Musculoskeletal: no edema/erythema/cyanosis, FROM in all extremities, no C/T/L spine ttp  Skin: no rash, no vesicles, no petechaie, no ecchymosis, no swelling  Neuro: AAOx3, CN 2-12 intact, normal sensation, 5/5 motor strength in all extremities, normal gait, no dysmetria    trop elevated 0.5, CTA showing large acute PE, cards and ICU consulted, anticoagulation, possible transfer

## 2020-06-04 NOTE — CONSULT NOTE ADULT - PROBLEM SELECTOR RECOMMENDATION 9
- Submassive PE - DVT and PE  RV strain documented -   on heparin gtt  discussed with pt and cardio MD -   will transfer to [place] for EKOS  ETOH use disorder - counseling - may need CIWA monitoring  serial trops -   ECHO formal eval   tele monitoring  hypercoagulable work up as outpatient

## 2020-06-04 NOTE — ED PROVIDER NOTE - OBJECTIVE STATEMENT
70 y/o F with HTN, HLD, "irregular heart beat" presents to ED s/p syncopal episode. Patient reports she went to put her garbage out at 530 am when she suddenly felt dizzy like she was spinning and fell to the ground. Patient woke up on the floor, unknown down time. States upon waking up she felt weak, walked into her house and laid on the couch a while. Her  took her BP and ti was low 90's/50s. She then proceeded to her bedroom with assistance from her  when she almost passed out again. She then got in the car to go to [facility] but felt as if she was going to pass out again while on the way and called and ambulance and came to this ED. Patient denies chest pain, SOB, fever, chills. Reports nausea, dizziness (like she was spinning), shakiness, and weakness as well as a frotnal headache which now resolved. Also had mid upper back pain "between the shoulder blades" and tingling to L arm which both have now resvoled. States she was in her usual state of health priror to this AM.   in ED.    PCP: Dr Hillman  Cardiology: Dr Villar ([place]) 70 y/o F with HTN, HLD, "irregular heart beat" presents to ED s/p syncopal episode. Patient reports she went to put her garbage out at 530 am when she suddenly felt dizzy like she was spinning and fell to the ground. Patient woke up on the floor, unknown down time. States upon waking up she felt weak, walked into her house and laid on the couch a while. Her  took her BP and ti was low 90's/50s. She then proceeded to her bedroom with assistance from her  when she almost passed out again. She then got in the car to go to [facility] but felt as if she was going to pass out again while on the way and called and ambulance and came to this ED. Patient denies chest pain, SOB, fever, chills. Reports nausea, dizziness (like she was spinning), shakiness, and weakness as well as a frotnal headache which now resolved. Also had mid upper back pain "between the shoulder blades" and tingling to L arm which both have now resvoled. States she was in her usual state of health prior to this AM.   in ED. Pt took 4 baby ASA prior to coming to ED    PCP: Dr Hillman  Cardiology: Dr Covarrubias ([place])

## 2020-06-04 NOTE — ED ADULT NURSE NOTE - OBJECTIVE STATEMENT
A&Ox4. Denies any pain or discomfort. Stated that she leaned over blood rushed to her head and she "passed out". Was able to recover and walk back into her home. BIB EMS.

## 2020-06-04 NOTE — ED PROVIDER NOTE - ATTENDING CONTRIBUTION TO CARE
The patient seen and examined    Massive PE    I, Thad Pal, performed the initial face to face bedside interview with this patient regarding history of present illness, review of symptoms and relevant past medical, social and family history.  I completed an independent physical examination.  I was the initial provider who evaluated this patient. I have signed out the follow up of any pending tests (i.e. labs, radiological studies) to the resident.  I have communicated the patient’s plan of care and disposition with the resident.

## 2020-06-04 NOTE — PROVIDER CONTACT NOTE (CRITICAL VALUE NOTIFICATION) - ACTION/TREATMENT ORDERED:
No new orders
repeat EKG, cardiology consult (patient took 4 baby aspirins at home)
Spoke with Charge Nurse in ER and relayed critical value to Tierney Triage RN

## 2020-06-04 NOTE — ED PROVIDER NOTE - CLINICAL SUMMARY MEDICAL DECISION MAKING FREE TEXT BOX
68y/o F pt with hx of htn and hld presenting as a txfer with saddle pe and LLE DVT. Call from [facility] showing with pt's ptt >200 so heparin d/lupe on arrival. Will order labs, ekg, coags, cxr, and reeval. ICU and CT surgery made aware.

## 2020-06-04 NOTE — CONSULT NOTE ADULT - SUBJECTIVE AND OBJECTIVE BOX
Date/Time Patient Seen:  		  Referring MD:   Data Reviewed	       Patient is a 69y old  Female who presents with a chief complaint of PE (04 Jun 2020 14:39)      Subjective/HPI    in bed  seen and examined  vs and meds reviewed  labs reviewed  er provider note reviewed  cardio eval noted  Patient is a 69y old  Female Pmhx HTN, HLD, and bowel resection presents to ED w/ syncopal episode early this morning and progressive RIVERA for 1 month. Pt admits to mechanical fall 2 wks ago causing swelling and bruising left Lower extremity. Denies long travels, fever, chills chest. Pt admits to mid back pain  b/w shoulders this am which now resolved. CTA chest show b/l PE's w/ rt heart strain.    ICU eval  Attending Attestation:   69F h/o HTN, HLD, chronic daily EtOH use (bottle of wine) p/w syncopal episode x2 and progressive RIVERA x1 month. During w/u CTA chest/abd/pel initially performed to r/o aortic dissection, subsequently found to have PE in main and R pulmonary artery. +DVT in LLE. Pt hemodyanmically stable with 100% sat on NC even while talking and repositioning in bed, with -105 and -120/60-90. Agree with heparin gtt for full AC. Trend cardiac enzymes. Obtain official TTE as poor windows noted on bedside POCUS. Pt does not require ICU level of care at this time. Stable for tele at this time. Would keep on CIWA given daily drinking of ~1bottle of wine/night. Please reconsult as pt's condition warrants.      etoh  drinker  lives at home  non smoker       PAST MEDICAL & SURGICAL HISTORY:  Atrial fibrillation  HLD (hyperlipidemia)  HTN (hypertension)  History of bowel resection        Medication list         MEDICATIONS  (STANDING):  heparin  Infusion.  Unit(s)/Hr (14 mL/Hr) IV Continuous <Continuous>    MEDICATIONS  (PRN):  heparin   Injectable 6500 Unit(s) IV Push every 6 hours PRN For aPTT less than 40  heparin   Injectable 3000 Unit(s) IV Push every 6 hours PRN For aPTT between 40 - 57         Vitals log        ICU Vital Signs Last 24 Hrs  T(C): 36.8 (04 Jun 2020 11:55), Max: 36.8 (04 Jun 2020 11:55)  T(F): 98.2 (04 Jun 2020 11:55), Max: 98.2 (04 Jun 2020 11:55)  HR: 87 (04 Jun 2020 15:15) (87 - 112)  BP: 122/79 (04 Jun 2020 15:15) (99/62 - 122/79)  BP(mean): --  ABP: --  ABP(mean): --  RR: 17 (04 Jun 2020 15:15) (16 - 18)  SpO2: 95% (04 Jun 2020 15:15) (90% - 100%)           Input and Output:  I&O's Detail      Lab Data                        14.3   8.72  )-----------( 135      ( 04 Jun 2020 09:06 )             43.6     06-04    143  |  110<H>  |  11  ----------------------------<  128<H>  4.4   |  25  |  0.69    Ca    8.7      04 Jun 2020 09:06  Phos  3.2     06-04  Mg     2.0     06-04    TPro  7.4  /  Alb  3.7  /  TBili  0.6  /  DBili  x   /  AST  133<H>  /  ALT  102<H>  /  AlkPhos  91  06-04      CARDIAC MARKERS ( 04 Jun 2020 15:39 )  1.150 ng/mL / x     / 96 U/L / x     / 4.7 ng/mL  CARDIAC MARKERS ( 04 Jun 2020 09:06 )  .511 ng/mL / x     / 82 U/L / x     / 2.1 ng/mL        Review of Systems	  chest tightness      Objective     Physical Examination    heart s1s2  lung dec BS  abd soft      Pertinent Lab findings & Imaging      Beebe:  NO   Adequate UO     I&O's Detail           Discussed with:     Cultures:	        Radiology    EXAM:  CT ANGIO CHEST (W)AW IC                            PROCEDURE DATE:  06/04/2020          INTERPRETATION:  CLINICAL INFORMATION: Syncope    COMPARISON: None.    PROCEDURE:   CT Angiography of the Chest, Abdomen and Pelvis.   Precontrast imaging was performed through the chest followed by arterial phase imaging of the chest, abdomen and pelvis.  Intravenous contrast: 90 ml Omnipaque 350. 10 ml discarded.  Oral contrast: None.  Sagittal and coronal reformats were performed as well as 3D (MIP) reconstructions.    FINDINGS:  CHEST:   LUNGS AND LARGE AIRWAYS: Patent central airways. No pulmonary nodules.  PLEURA: No pleural effusion.  VESSELS: Satisfactory contrast bolus. Large clot in the main and right pulmonary artery consistent with acute pulmonary embolus. No evidence of aortic dissection.  HEART: Relative enlargement of the right ventricle suggesting a component of right ventricular strain No pericardial effusion.  MEDIASTINUM AND PHILIP: No lymphadenopathy.  CHEST WALL AND LOWER NECK: Within normal limits.    ABDOMEN AND PELVIS:  LIVER: Steatosis. Scattered too small to characterize hypodensities  BILE DUCTS: Normal caliber.  GALLBLADDER: Within normal limits.  SPLEEN: Within normal limits.  PANCREAS: Within normal limits.  ADRENALS: Within normal limits.  KIDNEYS/URETERS: Bilateral renal cysts.    BLADDER: Within normal limits.  REPRODUCTIVE ORGANS: Unremarkable    BOWEL: No bowel obstruction. Colonic diverticula no diverticulitis. Appendix no appendicitis  PERITONEUM: No ascites.  VESSELS: Atherosclerotic nonaneurysmal. No dissection  RETROPERITONEUM/LYMPH NODES: No lymphadenopathy.    ABDOMINAL WALL: Fat-containing umbilical hernia  BONES: Within normal limits.    IMPRESSION:   Large acute pulmonary embolus main and right pulmonary artery as described. Suggestion of right ventricular strain.  No evidence of aortic dissection  Additional findings as discussed    Critical value discussed with EDEL Carrasco prior to this dictation                  DACIA HARO M.D., ATTENDING RADIOLOGIST  This document has been electronically signed. Jun 4 2020 11:38AM

## 2020-06-04 NOTE — H&P ADULT - RS GEN PE MLT RESP DETAILS PC
normal/airway patent/breath sounds equal/good air movement/no rhonchi/no wheezes/clear to auscultation bilaterally/no intercostal retractions/no rales/no subcutaneous emphysema/respirations non-labored/no chest wall tenderness

## 2020-06-04 NOTE — PROGRESS NOTE ADULT - ATTENDING COMMENTS
69F h/o HTN, HLD, chronic daily EtOH use (bottle of wine) p/w syncopal episode x2 and progressive RIVERA x1 month. During w/u CTA chest/abd/pel initially performed to r/o aortic dissection, subsequently found to have PE in main and R pulmonary artery. +DVT in LLE. Pt hemodyanmically stable with 100% sat on NC even while talking and repositioning in bed, with -105 and -120/60-90. Agree with heparin gtt for full AC. Trend cardiac enzymes. Obtain official TTE as poor windows noted on bedside POCUS. Pt does not require ICU level of care at this time. Stable for tele at this time. Would keep on CIWA given daily drinking of ~1bottle of wine/night. Please reconsult as pt's condition warrants. 69F h/o HTN, HLD, chronic daily EtOH use (bottle of wine) p/w syncopal episode x2 and progressive RIVERA x1 month. During w/u CTA chest/abd/pel initially performed to r/o aortic dissection, subsequently found to have PE in main and R pulmonary artery. +DVT in LLE. Pt hemodyanmically stable with 100% sat on NC even while talking and repositioning in bed, with -105 and -120/60-90. Agree with heparin gtt for full AC. Trend cardiac enzymes. Obtain official TTE as poor windows noted on bedside POCUS. Could consider transfer for thrombectomy given clot burden. Pt does not require ICU level of care at this time. Stable for tele at this time. Would keep on CIWA given daily drinking of ~1bottle of wine/night. Please reconsult as pt's condition warrants.

## 2020-06-04 NOTE — H&P ADULT - HISTORY OF PRESENT ILLNESS
Patient is 69 F PMH HTN, HLD, and glaucoma presented to [facility] this morning following two episodes of witnessed syncopal episodes at home, and was ultimately diagnosed with a saddle PE, as well as LLE DVT above/below knee. Transferred to [facility] for EKMO treatment. Denies trauma to head during syncopal episodes. Denies chest pain, palpitations, dyspnea, cough, fever, chills, n/v/d, abd pain, dysuria, headaches, weakness, dizziness, confusion, changes in vision. Patient is 69 F PMH HTN, HLD, and glaucoma presented to [facility] this morning diagnosed with an acute submassive saddle PE with RV strain, as well as LLE DVT femoral, popliteal, posterior tibial, and peroneal v. BIBA to [facility] following two episodes of witnessed syncopal episodes at home. Transferred to [facility] for EKMO treatment. Denies trauma to head during syncopal episodes. Denies chest pain, palpitations, dyspnea, cough, fever, chills, n/v/d, abd pain, dysuria, headaches, weakness, dizziness, confusion, changes in vision. Patient is 69 F PMH HTN, HLD, and glaucoma presented to [facility] this morning diagnosed with an acute submassive saddle PE with RV strain, as well as LLE DVT femoral, popliteal, posterior tibial, and peroneal v. BIBA to [place] following two episodes of witnessed syncopal episodes at home. Transferred to [facility] for EKOS treatment. Denies trauma to head during syncopal episodes. Denies chest pain, palpitations, dyspnea, cough, fever, chills, n/v/d, abd pain, dysuria, headaches, weakness, dizziness, confusion, changes in vision.

## 2020-06-04 NOTE — ED PROVIDER NOTE - CLINICAL SUMMARY MEDICAL DECISION MAKING FREE TEXT BOX
68 y/o F s/p syncope and collapse and two other near syncopal episodes prior ot arrival, no chest pain, reports dizziness and had pain to upper back with numbness to L arm, concerning for dissection plan= EKG, troponin, labs, CTA to eval for dissection, cardiac monitoring and cardiology consult, blood glucose 133

## 2020-06-04 NOTE — ED PROVIDER NOTE - OBJECTIVE STATEMENT
68 y/o F pt with hx of HTN and HLD presenting today as a txfer from [facility] ED with dx of a saddle PE. Per EMS pt had several syncopal episodes at home today. Initial w/u at [facility] was to eval for trauma and dissection, and the pt was negative for both, but w/u was remarkable for a saddle PE and LLE DVT. Pt arriving with heparin. Per EMS pt had an isolated episode of htn en route, but that self-resolved. Pt currently denies any complaints currently. VSS. ICU and CT surgery made aware. Pt denies any chest pain, dyspnea, fevers, n/v/d, abdominal pain, dysuria, headache, cough, congestion, sore throat, neck pain, back pain, weakness, numbness, tingling, dizziness, or other complaint.

## 2020-06-04 NOTE — ED ADULT NURSE NOTE - GASTROINTESTINAL ASSESSMENT
MRN:[id number]                      After Visit Summary   1/23/2018    Boom Kahn    MRN: [id number]           Thank you!     Thank you for choosing [facility] for your care. Our goal is always to provide you with excellent care. Hearing back from our patients is one way we can continue to improve our services. Please take a few minutes to complete the written survey that you may receive in the mail after you visit with us. Thank you!        Patient Information     Date Of Birth          1951        About your hospital stay     You were admitted on:  January 23, 2018 You last received care in the:   Transitional Care    You were discharged on:  February 1, 2018        Reason for your hospital stay       You were admitted for wound cares and therapy after vascular surgery                  Who to Call     For medical emergencies, please call 911.  For non-urgent questions about your medical care, please call your primary care provider or clinic, [phone]          Attending Provider     Provider Specialty    Rodriguez Snowden MD Internal Medicine       Primary Care Provider Office Phone # Fax #    Willian Arrington [phone]      After Care Instructions     Activity       As tolerated            Diet       2 gram sodium diet            Wound care and dressings       Instructions to care for your wound at home: wound vac as instructed                  Follow-up Appointments     Adult [facility] Follow-up and recommended labs and tests       PCP in one week for post rehab follow up   Follow up with vascular surgery as scheduled  Appointments on [facility]/or [facility] (with [facility] or [facility] provider or service). Call [phone] if you haven't heard regarding these appointments within 7 days of discharge.                  Your next 10 appointments already scheduled     Feb 15, 2018  3:00 PM CST   US LOWER EXTREMITY ARTERIAL DUPLEX RIGHT with [email or web address]    [facility]  ([facility])    [address] [place] [address] [id number] [phone]           Please bring a list of your medicines (including vitamins, minerals and over-the-counter drugs). Also, tell your doctor about any allergies you may have. Wear comfortable clothes and leave your valuables at home.  You do not need to do anything special to prepare for your exam.  Please call the Imaging Department at your exam site with any questions.            Feb 15, 2018  3:30 PM CST   US RAVI DOPPLER NO EXERCISE 1-2 LVLS BILAT with UCUSV1   [facility]  ([facility])    [address] [place] [address] [id number] [phone]           Please bring a list of your medicines (including vitamins, minerals and over-the-counter drugs). Also, tell your doctor about any allergies you may have. Wear comfortable clothes and leave your valuables at home.  No caffeine or tobacco for 1 hour prior to exam.  Please call the Imaging Department at your exam site with any questions.            Feb 15, 2018  4:30 PM CST   (Arrive by 4:15 PM)   Return Vascular Visit with Lexis Ag MD   [facility] ([facility])    [address] [place] [address] [id number] [phone]              Additional Services     Home care nursing referral       RN skilled nursing visit. RN to assess vital signs and weight, respiratory and cardiac status, pain level and activity tolerance, hydration, nutrition and bowel status, home safety and BLE wounds and coccyx wound.  RN to teach medication management and pain management, basic VAC troubleshooting.  RN to provide wound VAC dressing changes 3x/week and PRN.    Home care services to begin by 2/5/2018. Per Vascular NP Shannan Dobbs, OK to leave current VAC dressing (placed 1/31/18) on R lower leg until start of care 2/5/2018.    Your provider has ordered home  "care nursing services. If you have not been contacted within 2 days of your discharge please call the inpatient department phone number at [phone] .                  Further instructions from your care team       Patient will be staying locally beginning 2/4/2018- [address] [place] [address]    Silver VAC sponge was placed by Vascular Service on 1/31/2018, OK to leave this dressing on until 2/5/2018. VAC suction set at -50mmHg upon application, goal remains to increase to -125mmHg as patient tolerates. Home care to change VAC dressing 3x/week beginning 2/5/18.    You need to establish care with a Primary Care provider locally    Pending Results     No orders found from 1/21/2018 to 1/24/2018.            Statement of Approval     Ordered          01/31/18 1455  I have reviewed and agree with all the recommendations and orders detailed in this document.  EFFECTIVE NOW     Approved and electronically signed by:  Mariluz Pagan MD             Admission Information     Date & Time Provider Department Dept. Phone    1/23/2018 Rodriguez Snowden MD [facility] Transitional Care [phone]      Your Vitals Were     Blood Pressure Pulse Temperature Respirations Height Weight    102/52 73 98.5  F (36.9  C) (Oral) 18 1.727 m (5' 8\") 67.4 kg (148 lb 9.6 oz)    Pulse Oximetry BMI (Body Mass Index)                97% 22.59 kg/m2          MyChart Information     [employer] gives you secure access to your electronic health record. If you see a primary care provider, you can also send messages to your care team and make appointments. If you have questions, please call your primary care clinic.  If you do not have a primary care provider, please call [phone] and they will assist you.        Care EveryWhere ID     This is your Care EveryWhere ID. This could be used by other organizations to access your [facility] medical records  [id number]        Equal Access to Services     CHANEL DORADO AH: Zeina vasquez " Tram, mamtada luqadaha, qawoodta kasoheila hamilton, vijay evans. So [facility] [phone].    ATENCIÓN: Si domenicola naman, tiene a bhatia disposición servicios gratuitos de asistencia lingüística. Kevni al [phone].    We comply with applicable federal civil rights laws and [place] laws. We do not discriminate on the basis of race, color, national origin, age, disability, sex, sexual orientation, or gender identity.               Review of your medicines      START taking        Dose / Directions    ascorbic acid 500 MG Tabs   Used for:  Takes dietary supplements        Dose:  500 mg   Take 1 tablet (500 mg) by mouth daily   Quantity:  7 tablet   Refills:  0       folic acid 1 MG tablet   Commonly known as:  FOLVITE   Used for:  Takes dietary supplements        Dose:  1 mg   Take 1 tablet (1 mg) by mouth daily   Quantity:  30 tablet   Refills:  0       multivitamin, therapeutic with minerals Tabs tablet   Used for:  Takes dietary supplements        Dose:  1 tablet   Take 1 tablet by mouth daily   Quantity:  30 each   Refills:  0       vitamin A 24306 UNIT capsule   Used for:  Takes dietary supplements        Dose:  60601 Units   Take 2 capsules (20,000 Units) by mouth daily   Quantity:  7 capsule   Refills:  0       zinc sulfate 220 (50 ZN) MG capsule   Commonly known as:  ZINCATE   Used for:  Takes dietary supplements        Dose:  220 mg   Take 1 capsule (220 mg) by mouth daily   Quantity:  7 capsule   Refills:  0         CONTINUE these medicines which may have CHANGED, or have new prescriptions. If we are uncertain of the size of tablets/capsules you have at home, strength may be listed as something that might have changed.        Dose / Directions    carvedilol 6.25 MG tablet   Commonly known as:  COREG   Indication:  Heart Failure, CAD   This may have changed:  medication strength   Used for:  Ischemic cardiomyopathy        Dose:  6.25 mg   Take 1 tablet (6.25 mg) by mouth 2 times  daily (with meals)   Quantity:  60 tablet   Refills:  0       clopidogrel 75 MG tablet   Commonly known as:  PLAVIX   Indication:  Disease of the Peripheral Arteries   This may have changed:  Another medication with the same name was removed. Continue taking this medication, and follow the directions you see here.   Used for:  Ischemic cardiomyopathy        Dose:  75 mg   Take 1 tablet (75 mg) by mouth daily   Quantity:  30 tablet   Refills:  0       docusate sodium 100 MG capsule   Commonly known as:  COLACE   Indication:  Constipation   This may have changed:  medication strength   Used for:  Takes dietary supplements        Dose:  100 mg   Take 1 capsule (100 mg) by mouth daily   Quantity:  60 capsule   Refills:  0       escitalopram 20 MG tablet   Commonly known as:  LEXAPRO   Indication:  Depression   This may have changed:  medication strength        Dose:  20 mg   Take 1 tablet (20 mg) by mouth daily   Quantity:  15 tablet   Refills:  0       furosemide 20 MG tablet   Commonly known as:  LASIX   This may have changed:    - medication strength  - when to take this        Dose:  20 mg   Take 1 tablet (20 mg) by mouth 2 times daily (with meals)   Quantity:  60 tablet   Refills:  0       oxyCODONE IR 5 MG tablet   Commonly known as:  ROXICODONE   This may have changed:    - medication strength  - how much to take  - reasons to take this        Dose:  5-10 mg   Take 1-2 tablets (5-10 mg) by mouth every 3 hours as needed (pain)   Quantity:  45 tablet   Refills:  0       predniSONE 10 MG tablet   Commonly known as:  DELTASONE   Indication:  Chronic Obstructive Lung Disease   This may have changed:    - medication strength  - how much to take  - when to take this        Dose:  10 mg   Take 1 tablet (10 mg) by mouth daily   Quantity:  30 tablet   Refills:  0       rosuvastatin 20 MG tablet   Commonly known as:  CRESTOR   Indication:  High Amount of Fats in the Blood   This may have changed:  medication strength   Used  for:  Ischemic cardiomyopathy        Dose:  20 mg   Take 1 tablet (20 mg) by mouth daily   Quantity:  30 tablet   Refills:  0         CONTINUE these medicines which have NOT CHANGED        Dose / Directions    ASPIRIN EC PO        Dose:  81 mg   Take 81 mg by mouth daily   Refills:  0       enalapril 2.5 MG tablet   Commonly known as:  VASOTEC   Indication:  Congestive Heart Failure, High Blood Pressure Disorder   Used for:  Ischemic cardiomyopathy        Dose:  2.5 mg   Take 1 tablet (2.5 mg) by mouth 2 times daily   Quantity:  60 tablet   Refills:  0       ezetimibe 10 MG tablet   Commonly known as:  ZETIA   Indication:  Elevation of Both Cholesterol and Triglycerides in Blood   Used for:  Ischemic cardiomyopathy        Dose:  10 mg   Take 1 tablet (10 mg) by mouth daily   Quantity:  30 tablet   Refills:  0       fentaNYL 25 mcg/hr 72 hr patch   Commonly known as:  DURAGESIC   Indication:  Chronic Pain        Dose:  1 patch   Start taking on:  2/3/2018   Place 1 patch onto the skin every 72 hours for 14 days   Quantity:  4 patch   Refills:  0       * gabapentin 300 MG capsule   Commonly known as:  NEURONTIN   Indication:  Neurogenic Pain        Dose:  300 mg   Take 1 capsule (300 mg) by mouth 2 times daily   Quantity:  60 capsule   Refills:  0       * gabapentin 600 MG tablet   Commonly known as:  NEURONTIN   Indication:  Neurogenic Pain   Used for:  PVD (peripheral vascular disease) (H)        Dose:  600 mg   Take 1 tablet (600 mg) by mouth At Bedtime   Quantity:  30 tablet   Refills:  0       isosorbide mononitrate 60 MG 24 hr tablet   Commonly known as:  IMDUR   Indication:  CAD   Used for:  Ischemic cardiomyopathy        Dose:  60 mg   Take 1 tablet (60 mg) by mouth daily   Quantity:  30 tablet   Refills:  0       polyethylene glycol Packet   Commonly known as:  MIRALAX/GLYCOLAX   Indication:  Constipation   Used for:  Takes dietary supplements        Dose:  17 g   Take 17 g by mouth daily   Quantity:  7  packet   Refills:  0       * Notice:  This list has 2 medication(s) that are the same as other medications prescribed for you. Read the directions carefully, and ask your doctor or other care provider to review them with you.      STOP taking     acetaminophen 325 MG tablet   Commonly known as:  TYLENOL           ciprofloxacin 500 MG tablet   Commonly known as:  CIPRO           clindamycin 300 MG capsule   Commonly known as:  CLEOCIN           SPIRONOLACTONE PO                Where to get your medicines      These medications were sent to [place], [place] - [address]  [address] [facility] [address]     Phone:  [phone]     ascorbic acid 500 MG Tabs    carvedilol 6.25 MG tablet    clopidogrel 75 MG tablet    docusate sodium 100 MG capsule    enalapril 2.5 MG tablet    escitalopram 20 MG tablet    ezetimibe 10 MG tablet    folic acid 1 MG tablet    furosemide 20 MG tablet    gabapentin 300 MG capsule    gabapentin 600 MG tablet    isosorbide mononitrate 60 MG 24 hr tablet    multivitamin, therapeutic with minerals Tabs tablet    polyethylene glycol Packet    predniSONE 10 MG tablet    rosuvastatin 20 MG tablet    vitamin A 71254 UNIT capsule    zinc sulfate 220 (50 ZN) MG capsule         Some of these will need a paper prescription and others can be bought over the counter. Ask your nurse if you have questions.     Bring a paper prescription for each of these medications     fentaNYL 25 mcg/hr 72 hr patch    oxyCODONE IR 5 MG tablet                Protect others around you: Learn how to safely use, store and throw away your medicines at www.disposemymeds.org.        Information about OPIOIDS     PRESCRIPTION OPIOIDS: WHAT YOU NEED TO KNOW    Prescription opioids can be used to help relieve moderate to severe pain and are often prescribed following a surgery or injury, or for certain health conditions. These medications can be an important part of treatment but also  come with serious risks. It is important to work with your health care provider to make sure you are getting the safest, most effective care.    WHAT ARE THE RISKS AND SIDE EFFECTS OF OPIOID USE?  Prescription opioids carry serious risks of addiction and overdose, especially with prolonged use. An opioid overdose, often marked by slowed breathing can cause sudden death. The use of prescription opioids can have a number of side effects as well, even when taken as directed:      Tolerance - meaning you might need to take more of a medication for the same pain relief    Physical dependence - meaning you have symptoms of withdrawal when a medication is stopped    Increased sensitivity to pain    Constipation    Nausea, vomiting, and dry mouth    Sleepiness and dizziness    Confusion    Depression    Low levels of testosterone that can result in lower sex drive, energy, and strength    Itching and sweating    RISKS ARE GREATER WITH:    History of drug misuse, substance use disorder, or overdose    Mental health conditions (such as depression or anxiety)    Sleep apnea    Older age (65 years or older)    Pregnancy    Avoid alcohol while taking prescription opioids.   Also, unless specifically advised by your health care provider, medications to avoid include:    Benzodiazepines (such as Xanax or Valium)    Muscle relaxants (such as Soma or Flexeril)    Hypnotics (such as Ambien or Lunesta)    Other prescription opioids    KNOW YOUR OPTIONS:  Talk to your health care provider about ways to manage your pain that do not involve prescription opioids. Some of these options may actually work better and have fewer risks and side effects:    Pain relievers such as acetaminophen, ibuprofen, and naproxen    Some medications that are also used for depression or seizures    Physical therapy and exercise    Cognitive behavioral therapy, a psychological, goal-directed approach, in which patients learn how to modify physical,  behavioral, and emotional triggers of pain and stress    IF YOU ARE PRESCRIBED OPIOIDS FOR PAIN:    Never take opioids in greater amounts or more often than prescribed    Follow up with your primary health care provider and work together to create a plan on how to manage your pain.    Talk about ways to help manage your pain that do not involve prescription opioids    Talk about all concerns and side effects    Help prevent misuse and abuse    Never sell or share prescription opioids    Never use another person's prescription opioids    Store prescription opioids in a secure place and out of reach of others (this may include visitors, children, friends, and family)    Visit www.cdc.gov/drugoverdose to learn about risks of opioid abuse and overdose    If you believe you may be struggling with addiction, tell your health care provider and ask for guidance or call [facility]'s National Helpline at 2-426-254-HELP    LEARN MORE / www.cdc.gov/drugoverdose/prescribing/guideline.html    Safely dispose of unused prescription opioids: Find your local drug take-back programs and more information about the importance of safe disposal at www.doseofreality.[place].gov             Medication List: This is a list of all your medications and when to take them. Check marks below indicate your daily home schedule. Keep this list as a reference.      Medications           Morning Afternoon Evening Bedtime As Needed    ascorbic acid 500 MG Tabs   Take 1 tablet (500 mg) by mouth daily   Last time this was given:  500 mg on 1/30/2018  8:34 AM                                ASPIRIN EC PO   Take 81 mg by mouth daily   Last time this was given:  81 mg on 1/31/2018  9:30 AM                                carvedilol 6.25 MG tablet   Commonly known as:  COREG   Take 1 tablet (6.25 mg) by mouth 2 times daily (with meals)   Last time this was given:  6.25 mg on 1/31/2018  9:31 AM                                clopidogrel 75 MG tablet   Commonly known as:   PLAVIX   Take 1 tablet (75 mg) by mouth daily   Last time this was given:  75 mg on 1/31/2018  9:33 AM                                docusate sodium 100 MG capsule   Commonly known as:  COLACE   Take 1 capsule (100 mg) by mouth daily   Last time this was given:  100 mg on 1/31/2018  9:31 AM                                enalapril 2.5 MG tablet   Commonly known as:  VASOTEC   Take 1 tablet (2.5 mg) by mouth 2 times daily   Last time this was given:  2.5 mg on 1/31/2018  9:34 AM                                escitalopram 20 MG tablet   Commonly known as:  LEXAPRO   Take 1 tablet (20 mg) by mouth daily   Last time this was given:  20 mg on 1/31/2018  9:30 AM                                ezetimibe 10 MG tablet   Commonly known as:  ZETIA   Take 1 tablet (10 mg) by mouth daily   Last time this was given:  10 mg on 1/31/2018  7:27 PM                                fentaNYL 25 mcg/hr 72 hr patch   Commonly known as:  DURAGESIC   Place 1 patch onto the skin every 72 hours for 14 days   Start taking on:  2/3/2018   Last time this was given:  1 patch on 1/31/2018  9:38 AM                                folic acid 1 MG tablet   Commonly known as:  FOLVITE   Take 1 tablet (1 mg) by mouth daily   Last time this was given:  1 mg on 1/30/2018  8:32 AM                                furosemide 20 MG tablet   Commonly known as:  LASIX   Take 1 tablet (20 mg) by mouth 2 times daily (with meals)   Last time this was given:  20 mg on 1/31/2018  9:35 AM                                * gabapentin 300 MG capsule   Commonly known as:  NEURONTIN   Take 1 capsule (300 mg) by mouth 2 times daily   Last time this was given:  300 mg on 1/31/2018  4:18 PM                                * gabapentin 600 MG tablet   Commonly known as:  NEURONTIN   Take 1 tablet (600 mg) by mouth At Bedtime   Last time this was given:  600 mg on 1/31/2018 10:15 PM                                isosorbide mononitrate 60 MG 24 hr tablet   Commonly known as:   IMDUR   Take 1 tablet (60 mg) by mouth daily   Last time this was given:  60 mg on 1/31/2018  9:32 AM                                multivitamin, therapeutic with minerals Tabs tablet   Take 1 tablet by mouth daily   Last time this was given:  1 tablet on 1/31/2018  9:31 AM                                oxyCODONE IR 5 MG tablet   Commonly known as:  ROXICODONE   Take 1-2 tablets (5-10 mg) by mouth every 3 hours as needed (pain)   Last time this was given:  10 mg on 2/1/2018  3:32 AM                                polyethylene glycol Packet   Commonly known as:  MIRALAX/GLYCOLAX   Take 17 g by mouth daily   Last time this was given:  17 g on 1/28/2018  8:08 AM                                predniSONE 10 MG tablet   Commonly known as:  DELTASONE   Take 1 tablet (10 mg) by mouth daily   Last time this was given:  10 mg on 1/31/2018  9:28 AM                                rosuvastatin 20 MG tablet   Commonly known as:  CRESTOR   Take 1 tablet (20 mg) by mouth daily   Last time this was given:  20 mg on 1/31/2018  7:27 PM                                vitamin A 37125 UNIT capsule   Take 2 capsules (20,000 Units) by mouth daily   Last time this was given:  20,000 Units on 1/30/2018  8:31 AM                                zinc sulfate 220 (50 ZN) MG capsule   Commonly known as:  ZINCATE   Take 1 capsule (220 mg) by mouth daily   Last time this was given:  220 mg on 1/31/2018  9:30 AM                                * Notice:  This list has 2 medication(s) that are the same as other medications prescribed for you. Read the directions carefully, and ask your doctor or other care provider to review them with you.       - - -

## 2020-06-04 NOTE — CONSULT NOTE ADULT - ATTENDING COMMENTS
Seen/examined. agree with above.  syncope in setting of submassive PE. CT suggestive of right heart strain.  tachycardic and mildly hypoxic.   borderline BP. can give IVF and hold anti-hypertensives  check echocardiogram  check le dopplers  start heparin gtt for full dose a/c  Will need to be watched closely, if any sign of compromise, will discuss the option of transfer for catheter assisted therapy.  high risk of decompensation.

## 2020-06-04 NOTE — ED ADULT NURSE NOTE - ED STAT RN HANDOFF DETAILS
Report given to Jaja SCOTT at [facility].  Patient is pending transfer ER to ER.  Accepting doctor is Dr. Timmons.

## 2020-06-04 NOTE — ED ADULT NURSE NOTE - ED STAT RN HANDOFF DETAILS 2
EMS aware that ptt was sent at 1830, and [facility] RN can call here for the results when patient arrives at [facility].

## 2020-06-04 NOTE — H&P ADULT - ASSESSMENT
Patient is 69 F PMH HTN, HLD, and glaucoma admitted to MICU service at [facility] for acute submassive saddle PE with RV strain and LLE DVT. Now hemodynamically stable in NAD, awaiting transfer to unit for observation prior to EKMO treatment in AM.     Acute submassive saddle PE  - EKMO planning for tomorrow morning.   - Monitor for signs of instability/worsening condition: syncope, chest pain, AMS for acute management change to tPA.    LLE DVT  - Heparin gtt 1100u/hr with target aPTT 58-99s.   - aPTT checks q 1hr.   HTN  - Hold amlodipine in case of hemodynamic instability overnight, restart after EKMO initiation.   HLD   - Continue statin therapy with atorvastatin.  Glaucoma   -  Trusopt opthalmic sln.   ICU admission:   - NPO after midnight, except meds.   - F/u morning labs.   - Pantoprazole GI ppx. Patient is 69 F PMH HTN, HLD, and glaucoma admitted to MICU service at [facility] for acute submassive saddle PE with RV strain and LLE DVT.     Plan discussed with ICU attending Dr. Silvestre     Problem List:  1) acute submassive PE with RV strain  2) HTN  3) HLD  4) Acute Cor pulmonale     Acute submassive saddle PE  - EKOS planning for tomorrow morning.   - Monitor for signs of instability/worsening condition: syncope, chest pain, AMS, tachycardia, if develops will push 1/2 dose tPA. Given the size of PE and evidence of RV strain with cor pulmonale she poses a high risk of sudden decompensation and requires strict observation in ICU.   - Heparin gtt, full anticoagulation  - Aptt on admission was >200, hold heparin gtt for 1 hour and then follow nomogram, hold additional heparin boluses  -Was on 14ml/hr at [facility], will start at 11 ml/hr here  -Evidence for RV strain on CT and Echo, + Mitchell's sign, troponin peak at 1.1    Cor pulmonale  -Avoid IV fluids     LLE DVT  - Heparin gtt 1100u/hr with target aPTT 58-99s.     HTN  - Hold amlodipine in case of hemodynamic instability overnight, restart after EKOS initiation.     HLD   - Continue statin therapy with atorvastatin.    Glaucoma   -  Trusopt opthalmic sln.     ICU admission:   - NPO after midnight.   - F/u morning labs, BNP, CBC, CMP, mag, phos  - Pantoprazole GI ppx.     CRITICAL CARE TIME: 60 minutes assessing presenting problems of acute illness, which pose high probability of life threatening deterioration or end organ damage/dysfunction, as well as medical decision making including initiating plan of care, reviewing data, reviewing radiologic exams, discussing with multidisciplinary team,  discussing goals of care with patient/family, and writing this note.  Non-inclusive of procedures performed,

## 2020-06-04 NOTE — PROVIDER CONTACT NOTE (CRITICAL VALUE NOTIFICATION) - ACTION/TREATMENT ORDERED:
will recheck aPTT, heparin drip will remain paused until therapeutic level reached according to heparin nomogram

## 2020-06-04 NOTE — ED PROVIDER NOTE - PHYSICAL EXAMINATION
Gen: no acute distress  Head: normocephalic, atraumatic  Lung: CTAB, no respiratory distress, no wheezing, rales, rhonchi  CV: normal s1/s2, rrr,   Abd: soft, non-tender, non-distended  MSK: No edema, no visible deformities, full range of motion in all 4 extremities  Neuro: No focal neurologic deficits  Skin: No rash   Psych: normal affect

## 2020-06-04 NOTE — ED ADULT NURSE NOTE - CHPI ED NUR SYMPTOMS NEG
no fever/no vomiting/no confusion/no abrasion/no tingling/no weakness/no numbness/no deformity/no bleeding

## 2020-06-05 DIAGNOSIS — E78.5 HYPERLIPIDEMIA, UNSPECIFIED: ICD-10-CM

## 2020-06-05 DIAGNOSIS — I10 ESSENTIAL (PRIMARY) HYPERTENSION: ICD-10-CM

## 2020-06-05 DIAGNOSIS — I26.99 OTHER PULMONARY EMBOLISM WITHOUT ACUTE COR PULMONALE: ICD-10-CM

## 2020-06-05 DIAGNOSIS — F10.10 ALCOHOL ABUSE, UNCOMPLICATED: ICD-10-CM

## 2020-06-05 LAB
ALBUMIN SERPL ELPH-MCNC: 3.7 G/DL — SIGNIFICANT CHANGE UP (ref 3.3–5.2)
ALP SERPL-CCNC: 76 U/L — SIGNIFICANT CHANGE UP (ref 40–120)
ALT FLD-CCNC: 69 U/L — HIGH
ANION GAP SERPL CALC-SCNC: 12 MMOL/L — SIGNIFICANT CHANGE UP (ref 5–17)
APTT BLD: 42.8 SEC — HIGH (ref 27.5–36.3)
APTT BLD: 81.7 SEC — HIGH (ref 27.5–36.3)
APTT BLD: 85.5 SEC — HIGH (ref 27.5–36.3)
AST SERPL-CCNC: 54 U/L — HIGH
BASOPHILS # BLD AUTO: 0.03 K/UL — SIGNIFICANT CHANGE UP (ref 0–0.2)
BASOPHILS NFR BLD AUTO: 0.4 % — SIGNIFICANT CHANGE UP (ref 0–2)
BILIRUB SERPL-MCNC: 0.6 MG/DL — SIGNIFICANT CHANGE UP (ref 0.4–2)
BUN SERPL-MCNC: 15 MG/DL — SIGNIFICANT CHANGE UP (ref 8–20)
CALCIUM SERPL-MCNC: 8.5 MG/DL — LOW (ref 8.6–10.2)
CHLORIDE SERPL-SCNC: 105 MMOL/L — SIGNIFICANT CHANGE UP (ref 98–107)
CO2 SERPL-SCNC: 24 MMOL/L — SIGNIFICANT CHANGE UP (ref 22–29)
CREAT SERPL-MCNC: 0.59 MG/DL — SIGNIFICANT CHANGE UP (ref 0.5–1.3)
EOSINOPHIL # BLD AUTO: 0.08 K/UL — SIGNIFICANT CHANGE UP (ref 0–0.5)
EOSINOPHIL NFR BLD AUTO: 1.2 % — SIGNIFICANT CHANGE UP (ref 0–6)
FIBRINOGEN PPP-MCNC: 341 MG/DL — SIGNIFICANT CHANGE UP (ref 300–520)
GLUCOSE SERPL-MCNC: 114 MG/DL — HIGH (ref 70–99)
HCT VFR BLD CALC: 39.6 % — SIGNIFICANT CHANGE UP (ref 34.5–45)
HCT VFR BLD CALC: 39.7 % — SIGNIFICANT CHANGE UP (ref 34.5–45)
HCV AB S/CO SERPL IA: 0.1 S/CO — SIGNIFICANT CHANGE UP (ref 0–0.99)
HCV AB SERPL-IMP: SIGNIFICANT CHANGE UP
HGB BLD-MCNC: 12.9 G/DL — SIGNIFICANT CHANGE UP (ref 11.5–15.5)
HGB BLD-MCNC: 12.9 G/DL — SIGNIFICANT CHANGE UP (ref 11.5–15.5)
IMM GRANULOCYTES NFR BLD AUTO: 0.3 % — SIGNIFICANT CHANGE UP (ref 0–1.5)
LACTATE SERPL-SCNC: 1 MMOL/L — SIGNIFICANT CHANGE UP (ref 0.5–2)
LACTATE SERPL-SCNC: 1.1 MMOL/L — SIGNIFICANT CHANGE UP (ref 0.5–2)
LACTATE SERPL-SCNC: 1.2 MMOL/L — SIGNIFICANT CHANGE UP (ref 0.5–2)
LYMPHOCYTES # BLD AUTO: 1.69 K/UL — SIGNIFICANT CHANGE UP (ref 1–3.3)
LYMPHOCYTES # BLD AUTO: 24.9 % — SIGNIFICANT CHANGE UP (ref 13–44)
MAGNESIUM SERPL-MCNC: 2 MG/DL — SIGNIFICANT CHANGE UP (ref 1.6–2.6)
MCHC RBC-ENTMCNC: 32.5 GM/DL — SIGNIFICANT CHANGE UP (ref 32–36)
MCHC RBC-ENTMCNC: 32.6 GM/DL — SIGNIFICANT CHANGE UP (ref 32–36)
MCHC RBC-ENTMCNC: 32.6 PG — SIGNIFICANT CHANGE UP (ref 27–34)
MCHC RBC-ENTMCNC: 33 PG — SIGNIFICANT CHANGE UP (ref 27–34)
MCV RBC AUTO: 100 FL — SIGNIFICANT CHANGE UP (ref 80–100)
MCV RBC AUTO: 101.5 FL — HIGH (ref 80–100)
MONOCYTES # BLD AUTO: 0.51 K/UL — SIGNIFICANT CHANGE UP (ref 0–0.9)
MONOCYTES NFR BLD AUTO: 7.5 % — SIGNIFICANT CHANGE UP (ref 2–14)
NEUTROPHILS # BLD AUTO: 4.47 K/UL — SIGNIFICANT CHANGE UP (ref 1.8–7.4)
NEUTROPHILS NFR BLD AUTO: 65.7 % — SIGNIFICANT CHANGE UP (ref 43–77)
NT-PROBNP SERPL-SCNC: 1534 PG/ML — HIGH (ref 0–300)
NT-PROBNP SERPL-SCNC: 1602 PG/ML — HIGH (ref 0–300)
NT-PROBNP SERPL-SCNC: 2033 PG/ML — HIGH (ref 0–300)
PHOSPHATE SERPL-MCNC: 3.5 MG/DL — SIGNIFICANT CHANGE UP (ref 2.4–4.7)
PLATELET # BLD AUTO: 128 K/UL — LOW (ref 150–400)
PLATELET # BLD AUTO: 136 K/UL — LOW (ref 150–400)
POTASSIUM SERPL-MCNC: 4.1 MMOL/L — SIGNIFICANT CHANGE UP (ref 3.5–5.3)
POTASSIUM SERPL-SCNC: 4.1 MMOL/L — SIGNIFICANT CHANGE UP (ref 3.5–5.3)
PROT SERPL-MCNC: 6.3 G/DL — LOW (ref 6.6–8.7)
RBC # BLD: 3.91 M/UL — SIGNIFICANT CHANGE UP (ref 3.8–5.2)
RBC # BLD: 3.96 M/UL — SIGNIFICANT CHANGE UP (ref 3.8–5.2)
RBC # FLD: 13 % — SIGNIFICANT CHANGE UP (ref 10.3–14.5)
RBC # FLD: 13.1 % — SIGNIFICANT CHANGE UP (ref 10.3–14.5)
SODIUM SERPL-SCNC: 141 MMOL/L — SIGNIFICANT CHANGE UP (ref 135–145)
TROPONIN T SERPL-MCNC: 0.04 NG/ML — SIGNIFICANT CHANGE UP (ref 0–0.06)
TROPONIN T SERPL-MCNC: 0.04 NG/ML — SIGNIFICANT CHANGE UP (ref 0–0.06)
WBC # BLD: 6.1 K/UL — SIGNIFICANT CHANGE UP (ref 3.8–10.5)
WBC # BLD: 6.8 K/UL — SIGNIFICANT CHANGE UP (ref 3.8–10.5)
WBC # FLD AUTO: 6.1 K/UL — SIGNIFICANT CHANGE UP (ref 3.8–10.5)
WBC # FLD AUTO: 6.8 K/UL — SIGNIFICANT CHANGE UP (ref 3.8–10.5)

## 2020-06-05 PROCEDURE — 99223 1ST HOSP IP/OBS HIGH 75: CPT

## 2020-06-05 PROCEDURE — 93308 TTE F-UP OR LMTD: CPT | Mod: 26

## 2020-06-05 RX ORDER — HEPARIN SODIUM 5000 [USP'U]/ML
1100 INJECTION INTRAVENOUS; SUBCUTANEOUS
Qty: 25000 | Refills: 0 | Status: DISCONTINUED | OUTPATIENT
Start: 2020-06-05 | End: 2020-06-05

## 2020-06-05 RX ORDER — HEPARIN SODIUM 5000 [USP'U]/ML
6500 INJECTION INTRAVENOUS; SUBCUTANEOUS EVERY 6 HOURS
Refills: 0 | Status: DISCONTINUED | OUTPATIENT
Start: 2020-06-05 | End: 2020-06-05

## 2020-06-05 RX ORDER — HEPARIN SODIUM 5000 [USP'U]/ML
3000 INJECTION INTRAVENOUS; SUBCUTANEOUS EVERY 6 HOURS
Refills: 0 | Status: DISCONTINUED | OUTPATIENT
Start: 2020-06-05 | End: 2020-06-05

## 2020-06-05 RX ORDER — HEPARIN SODIUM 5000 [USP'U]/ML
INJECTION INTRAVENOUS; SUBCUTANEOUS
Qty: 25000 | Refills: 0 | Status: DISCONTINUED | OUTPATIENT
Start: 2020-06-05 | End: 2020-06-05

## 2020-06-05 RX ORDER — ALTEPLASE 100 MG
2 KIT INTRAVENOUS
Qty: 10 | Refills: 0 | Status: DISCONTINUED | OUTPATIENT
Start: 2020-06-05 | End: 2020-06-06

## 2020-06-05 RX ORDER — HEPARIN SODIUM 5000 [USP'U]/ML
1000 INJECTION INTRAVENOUS; SUBCUTANEOUS
Qty: 25000 | Refills: 0 | Status: DISCONTINUED | OUTPATIENT
Start: 2020-06-05 | End: 2020-06-06

## 2020-06-05 RX ADMIN — DORZOLAMIDE HYDROCHLORIDE 1 DROP(S): 20 SOLUTION/ DROPS OPHTHALMIC at 05:44

## 2020-06-05 RX ADMIN — CHLORHEXIDINE GLUCONATE 1 APPLICATION(S): 213 SOLUTION TOPICAL at 04:04

## 2020-06-05 RX ADMIN — LATANOPROST 1 DROP(S): 0.05 SOLUTION/ DROPS OPHTHALMIC; TOPICAL at 23:00

## 2020-06-05 RX ADMIN — HEPARIN SODIUM 1100 UNIT(S)/HR: 5000 INJECTION INTRAVENOUS; SUBCUTANEOUS at 05:55

## 2020-06-05 RX ADMIN — ALTEPLASE 50 MG/HR: KIT at 14:54

## 2020-06-05 RX ADMIN — ATORVASTATIN CALCIUM 20 MILLIGRAM(S): 80 TABLET, FILM COATED ORAL at 21:57

## 2020-06-05 NOTE — DIETITIAN INITIAL EVALUATION ADULT. - OTHER INFO
Patient is 69 F PMH HTN, HLD, and glaucoma presented to [facility] this morning diagnosed with an acute submassive saddle PE with RV strain, as well as LLE DVT femoral, popliteal, posterior tibial, and peroneal v. BIBA to [place] following two episodes of witnessed syncopal episodes at home. Transferred to [facility] for EKOS treatment.  Pt reports eating well PTA, no recent wt changes. Pt reports being very hungry; however has been NPO x 48 hours secondary to pending procedure. Food preferences obtained for when diet advanced.

## 2020-06-05 NOTE — CONSULT NOTE ADULT - ASSESSMENT
70 y/o female with PMHx of HTN, hyperlipidemia , who presents with syncope and found to have saddle PE and right main PE with lobar extension as well as evidence of RV strain on CT and echo  elevated biomarkers. PESI score of 1 (  at presentation) suggestive of increased risk  PERT discussion favoring catheter based therapies   Intermediate risk of bleeding in score risk scores ( Age > 66 y/o female, with history of ETHO abuse)   Discussed with the patient enrollment in the PE study at [facility] ( peripheral low dose systemic TPA vs ultrasound assisted catheter directed equivalent dose thrombolysis for treatment of submassive pulmonary embolism)   Patient agreeable   Randomized to peripheral low dose systemic TPA  Will start TPA 2 mg/hr for 12 hours , Heparin during TPA infusion to maintain aPTT 40-60 , followed by heparin anticoagulation to maintain goal aPTT 60-90 after TPA infusion   BNP, TNI , lactate , Fibrinogen q 4 hours during TPA infusion   if Fibrinogen level < 150 mg, decrease TPA infusion to 1 mg/hr  if Fibrinogen level < 100 stop TPA and call MD

## 2020-06-05 NOTE — CONSULT NOTE ADULT - SUBJECTIVE AND OBJECTIVE BOX
CARDIOLOGY CONSULTATION NOTE ([facility])  Consult requested by:      Reason for Consultation:     History obtained by: Patient and medical record     obtained: No    Chief complaint:    Patient is a 69y old  Female who presents with a chief complaint of Acute submassive PE (05 Jun 2020 10:26)      HPI:  Patient is 69 F PMH HTN, HLD, and glaucoma presented to [facility] this morning diagnosed with an acute submassive saddle PE with RV strain, as well as LLE DVT femoral, popliteal, posterior tibial, and peroneal v. BIBA to [place] following two episodes of witnessed syncopal episodes at home. Transferred to [facility] for evaluation for catheter based therapy for submassive PE  Patient has a prior history of fall and LLE trauma .   No prior history of bleeding or stroke   Initial O2 sat 92 % , with SBP 99. Echo at [place] showed RV dysfunction and RV dilatation.   She drinks one bottle of wine/day  She has remote history of irregular heart beats 45 years ago.        REVIEW OF SYMPTOMS: Cardiovascular:  as per HPI;  Respiratory:  as per HPI  Genitourinary:  No dysuria, no hematuria; Gastrointestinal:  No nausea, no vomiting. No diarrhea.  No abdominal pain. No dark color stool, no melena ; Neurological: No headache, no dizziness, no slurred speech;  Psychiatric: No agitation, no anxiety.  ALL OTHER REVIEW OF SYSTEMS ARE NEGATIVE.    ALLERGIES: Allergies    No Known Allergies    Intolerances    Decaf tea TID (Unknown)      MEDICATIONS  (STANDING):  atorvastatin 20 milliGRAM(s) Oral at bedtime  chlorhexidine 2% Cloths 1 Application(s) Topical <User Schedule>  dorzolamide 2% Ophthalmic Solution 1 Drop(s) Both EYES <User Schedule>  heparin  Infusion. 1100 Unit(s)/Hr (11 mL/Hr) IV Continuous <Continuous>  latanoprost 0.005% Ophthalmic Solution 1 Drop(s) Both EYES at bedtime    MEDICATIONS  (PRN):  acetaminophen   Tablet .. 650 milliGRAM(s) Oral every 6 hours PRN Mild Pain (1 - 3)  heparin   Injectable 6500 Unit(s) IV Push every 6 hours PRN For aPTT less than 40  heparin   Injectable 3000 Unit(s) IV Push every 6 hours PRN For aPTT between 40 - 57  LORazepam   Injectable 1 milliGRAM(s) IV Push every 2 hours PRN CIWA-Ar score increase by 2 points and a total score of 7 or less      CURRENT MEDICATIONS:     atorvastatin  chlorhexidine 2% Cloths  dorzolamide 2% Ophthalmic Solution  heparin  Infusion.  latanoprost 0.005% Ophthalmic Solution        PAST MEDICAL HISTORY  Atrial fibrillation  HLD (hyperlipidemia)  HTN (hypertension)      PAST SURGICAL HISTORY  History of bowel resection      FAMILY HISTORY:      SOCIAL HISTORY:  Denies smoking/alcohol/drugs      Vital Signs Last 24 Hrs  T(C): 36.7 (05 Jun 2020 12:00), Max: 37 (04 Jun 2020 18:58)  T(F): 98.1 (05 Jun 2020 12:00), Max: 98.6 (04 Jun 2020 18:58)  HR: 62 (05 Jun 2020 10:00) (62 - 97)  BP: 142/74 (05 Jun 2020 10:00) (116/78 - 172/97)  BP(mean): 99 (05 Jun 2020 10:00) (90 - 117)  RR: 18 (05 Jun 2020 10:00) (14 - 20)  SpO2: 97% (05 Jun 2020 10:00) (92% - 99%)      PHYSICAL EXAM:  Constitutional: Comfortable . No acute distress. on nasal canula  HEENT: Atraumatic and normocephalic , neck is supple . no JVD. No carotid bruit.   CNS: A&Ox3. No focal deficits.  Respiratory: CTAB  Cardiovascular: S1S2 RRR. No murmur/rubs or gallop.  Gastrointestinal: Soft non-tender and non distended . +Bowel sounds, no HSM  Extremities: No edema , DP and PT +2   Psychiatric: Calm . no agitation., mood appropriate  Skin: No skin lesions    Intake and output:   06-04 @ 07:01  -  06-05 @ 07:00  --------------------------------------------------------  IN: 88 mL / OUT: 0 mL / NET: 88 mL        LABS:                        12.9   6.80  )-----------( 128      ( 05 Jun 2020 04:51 )             39.6     06-05    141  |  105  |  15.0  ----------------------------<  114<H>  4.1   |  24.0  |  0.59    Ca    8.5<L>      05 Jun 2020 04:51  Phos  3.5     06-05  Mg     2.0     06-05    TPro  6.3<L>  /  Alb  3.7  /  TBili  0.6  /  DBili  x   /  AST  54<H>  /  ALT  69<H>  /  AlkPhos  76  06-05    CARDIAC MARKERS ( 04 Jun 2020 19:57 )  x     / 0.15 ng/mL / x     / x     / x      CARDIAC MARKERS ( 04 Jun 2020 15:39 )  1.150 ng/mL / x     / 96 U/L / x     / 4.7 ng/mL  CARDIAC MARKERS ( 04 Jun 2020 09:06 )  .511 ng/mL / x     / 82 U/L / x     / 2.1 ng/mL    ;p-BNP=Serum Pro-Brain Natriuretic Peptide: 2033 pg/mL (06-05 @ 11:37)    PT/INR - ( 04 Jun 2020 21:25 )   PT: 11.6 sec;   INR: 1.03 ratio         PTT - ( 05 Jun 2020 04:51 )  PTT:81.7 sec      INTERPRETATION OF TELEMETRY:  ECG: sinus tachcyardia, incomplete RBBB ,     RADIOLOGY & ADDITIONAL STUDIES:    X-ray:  no cardiomegaly , no pulmonary congestion     ECHO FINDINGS: pending reading

## 2020-06-05 NOTE — PROGRESS NOTE ADULT - PROBLEM SELECTOR PLAN 1
Sub-massive PE    -CTA evidence of a saddle and R pulmonary artery embolus, with  DVT in L fem, pop, posterior tibial, and peroneal veins.   -Troponin peak of 1.1. Started on Heparin infusion 1,110 units/hr. Avoid IV fluids due to RV strain.   -Continue to monitor Hemodynamically stability, push 1/2 tPA dose if decompensates to hemodynamically unstable.  -Candidate for EKOS therapy. Sub-massive PE    -CTA evidence of a saddle and R pulmonary artery embolus, with  DVT in L fem, pop, posterior tibial, and peroneal veins.   -Titrate Heparin dosage to an aPPT of 40-60. Start tPA infusion at 2mg/hour X12 hours.   -Pt to remain laying supine throughout the entirety of the treatment .  -BNP, lactate, troponin, and fibrogen Q4.  -Recheck aPPT Q4 and adjust Heparin accordingly.

## 2020-06-05 NOTE — PROGRESS NOTE ADULT - PROBLEM SELECTOR PLAN 4
Monitor for signs of alcohol withdrawal.  Start patient on CIWA protocol and librium taper. Monitor for signs of alcohol withdrawal.  Start patient on CIWA protocol and Ativan PRN. Monitor for signs of alcohol withdrawal.  Start patient on CIWA protocol and Ativan PRN.    Case discussed with Dr. Bain

## 2020-06-05 NOTE — DIETITIAN INITIAL EVALUATION ADULT. - PERTINENT LABORATORY DATA
06-05 Na141 mmol/L Glu 114 mg/dL<H> K+ 4.1 mmol/L Cr  0.59 mg/dL BUN 15.0 mg/dL Phos 3.5 mg/dL Alb 3.7 g/dL PAB n/a     n/a  HgbA1C

## 2020-06-05 NOTE — PROGRESS NOTE ADULT - ASSESSMENT
Patient is a 69y old Female PMHx of HTN, HLD, and bowel resection presenting w/ 2 acute episodes of syncope dx at [facility] w/ an Acute submassive PE 6/4/2020. Patient is a 69y old Female PMHx of HTN, HLD, and bowel resection presenting w/ 2 acute episodes of syncope dx at [facility], found to have    1. Acute submassive PE 6/4/2020.   2. LE DVT  3. Essential HTN

## 2020-06-05 NOTE — PROGRESS NOTE ADULT - SUBJECTIVE AND OBJECTIVE BOX
Patient is a 69y old  Female who presents with a chief complaint of Acute submassive PE (04 Jun 2020 21:37)      BRIEF HOSPITAL COURSE: ***    Events last 24 hours: ***    PAST MEDICAL & SURGICAL HISTORY:  HLD (hyperlipidemia)  HTN (hypertension)  History of bowel resection    Allergies    No Known Allergies    Intolerances      FAMILY HISTORY:      Review of Systems:  CONSTITUTIONAL: No fever, chills, or fatigue  EYES: No eye pain, visual disturbances, or discharge  ENMT:  No difficulty hearing, tinnitus, vertigo; No sinus or throat pain  NECK: No pain or stiffness  RESPIRATORY: No cough, wheezing, chills or hemoptysis; No shortness of breath  CARDIOVASCULAR: No chest pain, palpitations, dizziness, or leg swelling  GASTROINTESTINAL: No abdominal or epigastric pain. No nausea, vomiting, or hematemesis; No diarrhea or constipation. No melena or hematochezia.  GENITOURINARY: No dysuria, frequency, hematuria, or incontinence  NEUROLOGICAL: No headaches, memory loss, loss of strength, numbness, or tremors  SKIN: No itching, burning, rashes, or lesions   MUSCULOSKELETAL: No joint pain or swelling; No muscle, back, or extremity pain  PSYCHIATRIC: No depression, anxiety, mood swings, or difficulty sleeping      Medications:        acetaminophen   Tablet .. 650 milliGRAM(s) Oral every 6 hours PRN  LORazepam   Injectable 1 milliGRAM(s) IV Push every 2 hours PRN      heparin   Injectable 6500 Unit(s) IV Push every 6 hours PRN  heparin   Injectable 3000 Unit(s) IV Push every 6 hours PRN  heparin  Infusion. 1100 Unit(s)/Hr IV Continuous <Continuous>        atorvastatin 20 milliGRAM(s) Oral at bedtime        chlorhexidine 2% Cloths 1 Application(s) Topical <User Schedule>  dorzolamide 2% Ophthalmic Solution 1 Drop(s) Both EYES <User Schedule>  latanoprost 0.005% Ophthalmic Solution 1 Drop(s) Both EYES at bedtime            ICU Vital Signs Last 24 Hrs  T(C): 36.6 (05 Jun 2020 07:00), Max: 37 (04 Jun 2020 18:58)  T(F): 97.9 (05 Jun 2020 07:00), Max: 98.6 (04 Jun 2020 18:58)  HR: 63 (05 Jun 2020 08:00) (63 - 112)  BP: 128/78 (05 Jun 2020 08:00) (99/62 - 172/97)  BP(mean): 90 (05 Jun 2020 08:00) (90 - 117)  ABP: --  ABP(mean): --  RR: 20 (05 Jun 2020 08:00) (14 - 20)  SpO2: 97% (05 Jun 2020 08:00) (90% - 99%)    Vital Signs Last 24 Hrs  T(C): 36.6 (05 Jun 2020 07:00), Max: 37 (04 Jun 2020 18:58)  T(F): 97.9 (05 Jun 2020 07:00), Max: 98.6 (04 Jun 2020 18:58)  HR: 63 (05 Jun 2020 08:00) (63 - 112)  BP: 128/78 (05 Jun 2020 08:00) (99/62 - 172/97)  BP(mean): 90 (05 Jun 2020 08:00) (90 - 117)  RR: 20 (05 Jun 2020 08:00) (14 - 20)  SpO2: 97% (05 Jun 2020 08:00) (90% - 99%)        I&O's Detail    04 Jun 2020 07:01  -  05 Jun 2020 07:00  --------------------------------------------------------  IN:    heparin  Infusion.: 66 mL    heparin  Infusion.: 22 mL  Total IN: 88 mL    OUT:  Total OUT: 0 mL    Total NET: 88 mL            LABS:                        12.9   6.80  )-----------( 128      ( 05 Jun 2020 04:51 )             39.6     06-05    141  |  105  |  15.0  ----------------------------<  114<H>  4.1   |  24.0  |  0.59    Ca    8.5<L>      05 Jun 2020 04:51  Phos  3.5     06-05  Mg     2.0     06-05    TPro  6.3<L>  /  Alb  3.7  /  TBili  0.6  /  DBili  x   /  AST  54<H>  /  ALT  69<H>  /  AlkPhos  76  06-05      CARDIAC MARKERS ( 04 Jun 2020 19:57 )  x     / 0.15 ng/mL / x     / x     / x      CARDIAC MARKERS ( 04 Jun 2020 15:39 )  1.150 ng/mL / x     / 96 U/L / x     / 4.7 ng/mL  CARDIAC MARKERS ( 04 Jun 2020 09:06 )  .511 ng/mL / x     / 82 U/L / x     / 2.1 ng/mL      CAPILLARY BLOOD GLUCOSE      POCT Blood Glucose.: 114 mg/dL (04 Jun 2020 20:03)    PT/INR - ( 04 Jun 2020 21:25 )   PT: 11.6 sec;   INR: 1.03 ratio         PTT - ( 05 Jun 2020 04:51 )  PTT:81.7 sec    CULTURES:      Physical Examination:    General: No acute distress.  Alert, oriented, interactive, nonfocal    HEENT: Pupils equal, reactive to light.  Symmetric.    PULM: Clear to auscultation bilaterally, no significant sputum production    CVS: Regular rate and rhythm, no murmurs, rubs, or gallops    ABD: Soft, nondistended, nontender, normoactive bowel sounds, no masses    EXT: No edema, nontender    SKIN: Warm and well perfused, no rashes noted.    RADIOLOGY: ***    CRITICAL CARE TIME SPENT: *** Patient is a 69y old Female PMHx of HTN, HLD, and bowel resection presenting from [facility] with a chief complaint of Acute submassive PE (04 Jun 2020 21:37).      BRIEF HOSPITAL COURSE: ***  Patient is a 69y old Female PMHx of HTN, HLD, and bowel resection presenting from [facility] after experiencing 2 episodes of syncope. PT dx w/ an acute submassive saddle PE w/ RV strain and LLE DVT femoral, popliteal, posterior tibial, and peroneal. Pt was transferred to [facility] for EKOS treatment. Pt experienced a recent traumatic fall on 5/11/2020, resulting in a hospitalization. Pt denies any trauma to the head during syncopal episodes, chest pain, palpations, dyspnea, cough, fever, chills, n/v/d, abd pain, dysuria, HA, dizziness or confusion.     Events last 24 hours: ***  Pt transferred from [place] for EKOS eval   DIONNA- + Mitchell's sign, w/ RV strain   Limited ECHO and BNP ordered  CTA showed Sub-massive R PE, with RV strain       PAST MEDICAL & SURGICAL HISTORY:  HLD (hyperlipidemia)  HTN (hypertension)  History of bowel resection    Allergies    No Known Allergies    Intolerances      FAMILY HISTORY:  -Denies     Social History  -Endorses a 1 bottle of wine consumption.     Review of Systems:  CONSTITUTIONAL: No fever, chills, or fatigue  EYES: No eye pain, visual disturbances, or discharge  ENMT:  No difficulty hearing, tinnitus, vertigo; No sinus or throat pain  NECK: No pain or stiffness  RESPIRATORY: No cough, wheezing, chills or hemoptysis; No shortness of breath  CARDIOVASCULAR: No chest pain, palpitations, dizziness  GASTROINTESTINAL: No abdominal or epigastric pain. No nausea, vomiting, or hematemesis; No diarrhea or constipation. No melena or hematochezia.  GENITOURINARY: No dysuria, frequency, hematuria, or incontinence  NEUROLOGICAL: No headaches, memory loss, loss of strength, numbness, or tremors  SKIN: No itching, burning, rashes, or lesions   MUSCULOSKELETAL: B/L lower extremity swelling,  No joint pain ; No muscle, or extremity pain  PSYCHIATRIC: No depression, anxiety, mood swings, or difficulty sleeping      Medications:        acetaminophen   Tablet .. 650 milliGRAM(s) Oral every 6 hours PRN  LORazepam   Injectable 1 milliGRAM(s) IV Push every 2 hours PRN      heparin   Injectable 6500 Unit(s) IV Push every 6 hours PRN  heparin   Injectable 3000 Unit(s) IV Push every 6 hours PRN  heparin  Infusion. 1100 Unit(s)/Hr IV Continuous <Continuous>        atorvastatin 20 milliGRAM(s) Oral at bedtime        chlorhexidine 2% Cloths 1 Application(s) Topical <User Schedule>  dorzolamide 2% Ophthalmic Solution 1 Drop(s) Both EYES <User Schedule>  latanoprost 0.005% Ophthalmic Solution 1 Drop(s) Both EYES at bedtime            ICU Vital Signs Last 24 Hrs  T(C): 36.6 (05 Jun 2020 07:00), Max: 37 (04 Jun 2020 18:58)  T(F): 97.9 (05 Jun 2020 07:00), Max: 98.6 (04 Jun 2020 18:58)  HR: 63 (05 Jun 2020 08:00) (63 - 112)  BP: 128/78 (05 Jun 2020 08:00) (99/62 - 172/97)  BP(mean): 90 (05 Jun 2020 08:00) (90 - 117)  ABP: --  ABP(mean): --  RR: 20 (05 Jun 2020 08:00) (14 - 20)  SpO2: 97% (05 Jun 2020 08:00) (90% - 99%)    Vital Signs Last 24 Hrs  T(C): 36.6 (05 Jun 2020 07:00), Max: 37 (04 Jun 2020 18:58)  T(F): 97.9 (05 Jun 2020 07:00), Max: 98.6 (04 Jun 2020 18:58)  HR: 63 (05 Jun 2020 08:00) (63 - 112)  BP: 128/78 (05 Jun 2020 08:00) (99/62 - 172/97)  BP(mean): 90 (05 Jun 2020 08:00) (90 - 117)  RR: 20 (05 Jun 2020 08:00) (14 - 20)  SpO2: 97% (05 Jun 2020 08:00) (90% - 99%)        I&O's Detail    04 Jun 2020 07:01  -  05 Jun 2020 07:00  --------------------------------------------------------  IN:    heparin  Infusion.: 66 mL    heparin  Infusion.: 22 mL  Total IN: 88 mL    OUT:  Total OUT: 0 mL    Total NET: 88 mL            LABS:                        12.9   6.80  )-----------( 128      ( 05 Jun 2020 04:51 )             39.6     06-05    141  |  105  |  15.0  ----------------------------<  114<H>  4.1   |  24.0  |  0.59    Ca    8.5<L>      05 Jun 2020 04:51  Phos  3.5     06-05  Mg     2.0     06-05    TPro  6.3<L>  /  Alb  3.7  /  TBili  0.6  /  DBili  x   /  AST  54<H>  /  ALT  69<H>  /  AlkPhos  76  06-05      CARDIAC MARKERS ( 04 Jun 2020 19:57 )  x     / 0.15 ng/mL / x     / x     / x      CARDIAC MARKERS ( 04 Jun 2020 15:39 )  1.150 ng/mL / x     / 96 U/L / x     / 4.7 ng/mL  CARDIAC MARKERS ( 04 Jun 2020 09:06 )  .511 ng/mL / x     / 82 U/L / x     / 2.1 ng/mL      CAPILLARY BLOOD GLUCOSE      POCT Blood Glucose.: 114 mg/dL (04 Jun 2020 20:03)    PT/INR - ( 04 Jun 2020 21:25 )   PT: 11.6 sec;   INR: 1.03 ratio         PTT - ( 05 Jun 2020 04:51 )  PTT:81.7 sec    CULTURES:  None     Physical Examination:    General: No acute distress.  Alert, oriented, interactive, nonfocal    HEENT: Pupils equal, reactive to light.  Symmetric.    PULM: Clear to auscultation bilaterally, no significant sputum production    CVS: Regular rate and rhythm, no murmurs, rubs, or gallops    ABD: Soft, nondistended, nontender, normoactive bowel sounds, no masses    EXT: B/L LE edema, nontender    SKIN: Warm and well perfused, no rashes noted.    RADIOLOGY: ***  CTA- Acute PE of main and R pulmonary artery, RV strain, no dissection.   Dopplex U/S- Acute DVT in L fem, pop, posterior tibial, and peroneal veins.   CT head- negative   CXR- negative     CRITICAL CARE TIME SPENT: *** Patient is a 69y old Female PMHx of HTN, HLD, and bowel resection presenting from [facility] with a chief complaint of Acute submassive PE (04 Jun 2020 21:37).      BRIEF HOSPITAL COURSE:   Patient is a 69y old Female PMHx of HTN, HLD, and bowel resection presenting from [facility] after experiencing 2 episodes of syncope. PT dx w/ an acute submassive saddle PE w/ RV strain and LLE femoral, popliteal, posterior tibial, and peroneal DVT. Pt was then transferred to [facility] for EKOS evaluation. Of note patient had recent traumatic fall on 5/11/2020, resulting in a hospitalization. Pt denies any trauma to the head during syncopal episodes, chest pain, palpations, dyspnea, cough, fever, chills, n/v/d, abd pain, dysuria, HA, dizziness or confusion.     Events last 24 hours:   Pt transferred from [place] for EKOS eval   DIONNA- + Mitchell's sign, w/ RV strain   Limited ECHO and BNP ordered STAT  CTA showed Sub-massive R PE, with RV strain       PAST MEDICAL & SURGICAL HISTORY:  HLD (hyperlipidemia)  HTN (hypertension)  History of bowel resection    Allergies    No Known Allergies    Intolerances      FAMILY HISTORY:  -Denies     Social History  -Endorses a 1 bottle of wine consumption.     Review of Systems:  CONSTITUTIONAL: No fever, chills, or fatigue  EYES: No eye pain, visual disturbances, or discharge  ENMT:  No difficulty hearing, tinnitus, vertigo; No sinus or throat pain  NECK: No pain or stiffness  RESPIRATORY: No cough, wheezing, chills or hemoptysis; No shortness of breath  CARDIOVASCULAR: No chest pain, palpitations, dizziness  GASTROINTESTINAL: No abdominal or epigastric pain. No nausea, vomiting, or hematemesis; No diarrhea or constipation. No melena or hematochezia.  GENITOURINARY: No dysuria, frequency, hematuria, or incontinence  NEUROLOGICAL: No headaches, memory loss, loss of strength, numbness, or tremors  SKIN: No itching, burning, rashes, or lesions   MUSCULOSKELETAL: B/L lower extremity swelling,  No joint pain ; No muscle, or extremity pain  PSYCHIATRIC: No depression, anxiety, mood swings, or difficulty sleeping      Medications:  acetaminophen   Tablet .. 650 milliGRAM(s) Oral every 6 hours PRN  LORazepam   Injectable 1 milliGRAM(s) IV Push every 2 hours PRN  heparin   Injectable 6500 Unit(s) IV Push every 6 hours PRN  heparin   Injectable 3000 Unit(s) IV Push every 6 hours PRN  heparin  Infusion. 1100 Unit(s)/Hr IV Continuous <Continuous>  atorvastatin 20 milliGRAM(s) Oral at bedtime  chlorhexidine 2% Cloths 1 Application(s) Topical <User Schedule>  dorzolamide 2% Ophthalmic Solution 1 Drop(s) Both EYES <User Schedule>  latanoprost 0.005% Ophthalmic Solution 1 Drop(s) Both EYES at bedtime        ICU Vital Signs Last 24 Hrs  T(C): 36.6 (05 Jun 2020 07:00), Max: 37 (04 Jun 2020 18:58)  T(F): 97.9 (05 Jun 2020 07:00), Max: 98.6 (04 Jun 2020 18:58)  HR: 63 (05 Jun 2020 08:00) (63 - 112)  BP: 128/78 (05 Jun 2020 08:00) (99/62 - 172/97)  BP(mean): 90 (05 Jun 2020 08:00) (90 - 117)  RR: 20 (05 Jun 2020 08:00) (14 - 20)  SpO2: 97% (05 Jun 2020 08:00) (90% - 99%)          I&O's Detail    04 Jun 2020 07:01  -  05 Jun 2020 07:00  --------------------------------------------------------  IN:    heparin  Infusion.: 66 mL    heparin  Infusion.: 22 mL  Total IN: 88 mL    OUT:  Total OUT: 0 mL    Total NET: 88 mL            LABS:                        12.9   6.80  )-----------( 128      ( 05 Jun 2020 04:51 )             39.6     06-05    141  |  105  |  15.0  ----------------------------<  114<H>  4.1   |  24.0  |  0.59    Ca    8.5<L>      05 Jun 2020 04:51  Phos  3.5     06-05  Mg     2.0     06-05    TPro  6.3<L>  /  Alb  3.7  /  TBili  0.6  /  DBili  x   /  AST  54<H>  /  ALT  69<H>  /  AlkPhos  76  06-05      CARDIAC MARKERS ( 04 Jun 2020 19:57 )  x     / 0.15 ng/mL / x     / x     / x      CARDIAC MARKERS ( 04 Jun 2020 15:39 )  1.150 ng/mL / x     / 96 U/L / x     / 4.7 ng/mL  CARDIAC MARKERS ( 04 Jun 2020 09:06 )  .511 ng/mL / x     / 82 U/L / x     / 2.1 ng/mL      CAPILLARY BLOOD GLUCOSE      POCT Blood Glucose.: 114 mg/dL (04 Jun 2020 20:03)    PT/INR - ( 04 Jun 2020 21:25 )   PT: 11.6 sec;   INR: 1.03 ratio         PTT - ( 05 Jun 2020 04:51 )  PTT:81.7 sec    CULTURES:  None     Physical Examination:    General: No acute distress.  Alert, oriented, interactive, nonfocal    HEENT: Pupils equal, reactive to light.  Symmetric.    PULM: Clear to auscultation bilaterally, no significant sputum production    CVS: Regular rate and rhythm, no murmurs, rubs, or gallops    ABD: Soft, nondistended, nontender, normoactive bowel sounds, no masses    EXT: B/L LE edema, nontender    SKIN: Warm and well perfused, no rashes noted.    Neuro: alert and oriented x3, moves all extr    RADIOLOGY:   < from: CT Angio Chest w/ IV Cont (06.04.20 @ 11:39) >  INTERPRETATION:  CLINICAL INFORMATION: Syncope    COMPARISON: None.    PROCEDURE:   CT Angiography of the Chest, Abdomen and Pelvis.   Precontrast imaging was performed through the chest followed by arterial phase imaging of the chest, abdomen and pelvis.  Intravenous contrast: 90 ml Omnipaque 350. 10 ml discarded.  Oral contrast: None.  Sagittal and coronal reformats were performed as well as 3D (MIP) reconstructions.    FINDINGS:  CHEST:   LUNGS AND LARGE AIRWAYS: Patent central airways. No pulmonary nodules.  PLEURA: No pleural effusion.  VESSELS: Satisfactory contrast bolus. Large clot in the main and right pulmonary artery consistent with acute pulmonary embolus. No evidence of aortic dissection.  HEART: Relative enlargement of the right ventricle suggesting a component of right ventricular strain No pericardial effusion.  MEDIASTINUM AND PHILIP: No lymphadenopathy.  CHEST WALL AND LOWER NECK: Within normal limits.    ABDOMEN AND PELVIS:  LIVER: Steatosis. Scattered too small to characterize hypodensities  BILE DUCTS: Normal caliber.  GALLBLADDER: Within normal limits.  SPLEEN: Within normal limits.  PANCREAS: Within normal limits.  ADRENALS: Within normal limits.  KIDNEYS/URETERS: Bilateral renal cysts.    BLADDER: Within normal limits.  REPRODUCTIVE ORGANS: Unremarkable    BOWEL: No bowel obstruction. Colonic diverticula no diverticulitis. Appendix no appendicitis  PERITONEUM: No ascites.  VESSELS: Atherosclerotic nonaneurysmal. No dissection  RETROPERITONEUM/LYMPH NODES: No lymphadenopathy.    ABDOMINAL WALL: Fat-containing umbilical hernia  BONES: Within normal limits.    IMPRESSION:   Large acute pulmonary embolus main and right pulmonary artery as described. Suggestion of right ventricular strain.  No evidence of aortic dissection  Additional findings as discussed        < from: US Duplex Venous Lower Ext Complete, Bilateral (06.04.20 @ 14:56) >    INTERPRETATION:  CLINICAL INFORMATION: Pulmonary embolism.    COMPARISON: None available.    TECHNIQUE: Duplex sonography of the BILATERAL LOWER extremity veins with color and spectral Doppler, with and without compression.      FINDINGS:    Acute deep venous thrombosis in the left femoral, popliteal, posterior tibial, and peroneal veins.    There is normal compressibility of the right common femoral, femoral and popliteal veins.     Right popliteal cyst, measuring 2.2 x 0.6 x 1.3 cm.    IMPRESSION:   Acute deep venous thrombosis in the left lower extremity: above and below the knee.    No evidence of deep venous thrombosis in the right lower extremity.    Findings were discussed with Dr. Romero on 6/4/2020 at 2:59 PM by Dr. Segovia with read back confirmation.      Critical Care time: 35 mins assessing presenting problems of acute illness that poses high probability of life threatening deterioration or end organ damage/dysfunction.  Medical decision making inculding Initiating plan of care, reviewing data, reviewing radiology,direct patient bedside evaluation and interpretation of vital signs, any necessary ventilator management , discusion with multidisciplinary team, discussing goals of care with patient/family, all non inclusive of procedures Patient is a 69y old Female PMHx of HTN, HLD, and bowel resection presenting from [facility] with a chief complaint of Acute submassive PE (04 Jun 2020 21:37).      BRIEF HOSPITAL COURSE:   Patient is a 69y old Female PMHx of HTN, HLD, and bowel resection presenting from [facility] after experiencing 2 episodes of syncope. PT dx w/ an acute submassive saddle PE w/ RV strain and LLE femoral, popliteal, posterior tibial, and peroneal DVT. Pt was then transferred to [facility] for EKOS evaluation. Of note patient had recent traumatic fall on 5/11/2020, resulting in a hospitalization. Pt denies any trauma to the head during syncopal episodes, chest pain, palpations, dyspnea, cough, fever, chills, n/v/d, abd pain, dysuria, HA, dizziness or confusion.     Events last 24 hours:   Pt transferred from [place] for EKOS eval   DIONNA- + Mitchell's sign, w/ RV strain   Limited ECHO and BNP ordered STAT  CTA showed Sub-massive R PE, with RV strain   BNP, aPPT drawn prior to peripheral tPA therapy      PAST MEDICAL & SURGICAL HISTORY:  HLD (hyperlipidemia)  HTN (hypertension)  History of bowel resection    Allergies    No Known Allergies    Intolerances      FAMILY HISTORY:  -Denies     Social History  -Endorses a 1 bottle of wine consumption.     Review of Systems:  CONSTITUTIONAL: No fever, chills, or fatigue  EYES: No eye pain, visual disturbances, or discharge  ENMT:  No difficulty hearing, tinnitus, vertigo; No sinus or throat pain  NECK: No pain or stiffness  RESPIRATORY: No cough, wheezing, chills or hemoptysis; No shortness of breath  CARDIOVASCULAR: No chest pain, palpitations, dizziness  GASTROINTESTINAL: No abdominal or epigastric pain. No nausea, vomiting, or hematemesis; No diarrhea or constipation. No melena or hematochezia.  GENITOURINARY: No dysuria, frequency, hematuria, or incontinence  NEUROLOGICAL: No headaches, memory loss, loss of strength, numbness, or tremors  SKIN: No itching, burning, rashes, or lesions   MUSCULOSKELETAL: B/L lower extremity swelling,  No joint pain ; No muscle, or extremity pain  PSYCHIATRIC: No depression, anxiety, mood swings, or difficulty sleeping      Medications:  acetaminophen   Tablet .. 650 milliGRAM(s) Oral every 6 hours PRN  LORazepam   Injectable 1 milliGRAM(s) IV Push every 2 hours PRN  heparin   Injectable 6500 Unit(s) IV Push every 6 hours PRN  heparin   Injectable 3000 Unit(s) IV Push every 6 hours PRN  heparin  Infusion. 1100 Unit(s)/Hr IV Continuous <Continuous>  atorvastatin 20 milliGRAM(s) Oral at bedtime  chlorhexidine 2% Cloths 1 Application(s) Topical <User Schedule>  dorzolamide 2% Ophthalmic Solution 1 Drop(s) Both EYES <User Schedule>  latanoprost 0.005% Ophthalmic Solution 1 Drop(s) Both EYES at bedtime        ICU Vital Signs Last 24 Hrs  T(C): 36.6 (05 Jun 2020 07:00), Max: 37 (04 Jun 2020 18:58)  T(F): 97.9 (05 Jun 2020 07:00), Max: 98.6 (04 Jun 2020 18:58)  HR: 63 (05 Jun 2020 08:00) (63 - 112)  BP: 128/78 (05 Jun 2020 08:00) (99/62 - 172/97)  BP(mean): 90 (05 Jun 2020 08:00) (90 - 117)  RR: 20 (05 Jun 2020 08:00) (14 - 20)  SpO2: 97% (05 Jun 2020 08:00) (90% - 99%)          I&O's Detail    04 Jun 2020 07:01  -  05 Jun 2020 07:00  --------------------------------------------------------  IN:    heparin  Infusion.: 66 mL    heparin  Infusion.: 22 mL  Total IN: 88 mL    OUT:  Total OUT: 0 mL    Total NET: 88 mL            LABS:                        12.9   6.80  )-----------( 128      ( 05 Jun 2020 04:51 )             39.6     06-05    141  |  105  |  15.0  ----------------------------<  114<H>  4.1   |  24.0  |  0.59    Ca    8.5<L>      05 Jun 2020 04:51  Phos  3.5     06-05  Mg     2.0     06-05    TPro  6.3<L>  /  Alb  3.7  /  TBili  0.6  /  DBili  x   /  AST  54<H>  /  ALT  69<H>  /  AlkPhos  76  06-05      CARDIAC MARKERS ( 04 Jun 2020 19:57 )  x     / 0.15 ng/mL / x     / x     / x      CARDIAC MARKERS ( 04 Jun 2020 15:39 )  1.150 ng/mL / x     / 96 U/L / x     / 4.7 ng/mL  CARDIAC MARKERS ( 04 Jun 2020 09:06 )  .511 ng/mL / x     / 82 U/L / x     / 2.1 ng/mL      CAPILLARY BLOOD GLUCOSE      POCT Blood Glucose.: 114 mg/dL (04 Jun 2020 20:03)    PT/INR - ( 04 Jun 2020 21:25 )   PT: 11.6 sec;   INR: 1.03 ratio         PTT - ( 05 Jun 2020 04:51 )  PTT:81.7 sec    CULTURES:  None     Physical Examination:    General: No acute distress.  Alert, oriented, interactive, nonfocal    HEENT: Pupils equal, reactive to light.  Symmetric.    PULM: Clear to auscultation bilaterally, no significant sputum production    CVS: Regular rate and rhythm, no murmurs, rubs, or gallops    ABD: Soft, nondistended, nontender, normoactive bowel sounds, no masses    EXT: B/L LE edema, nontender    SKIN: Warm and well perfused, no rashes noted.    Neuro: alert and oriented x3, moves all extr    RADIOLOGY:   < from: CT Angio Chest w/ IV Cont (06.04.20 @ 11:39) >  INTERPRETATION:  CLINICAL INFORMATION: Syncope    COMPARISON: None.    PROCEDURE:   CT Angiography of the Chest, Abdomen and Pelvis.   Precontrast imaging was performed through the chest followed by arterial phase imaging of the chest, abdomen and pelvis.  Intravenous contrast: 90 ml Omnipaque 350. 10 ml discarded.  Oral contrast: None.  Sagittal and coronal reformats were performed as well as 3D (MIP) reconstructions.    FINDINGS:  CHEST:   LUNGS AND LARGE AIRWAYS: Patent central airways. No pulmonary nodules.  PLEURA: No pleural effusion.  VESSELS: Satisfactory contrast bolus. Large clot in the main and right pulmonary artery consistent with acute pulmonary embolus. No evidence of aortic dissection.  HEART: Relative enlargement of the right ventricle suggesting a component of right ventricular strain No pericardial effusion.  MEDIASTINUM AND PHILIP: No lymphadenopathy.  CHEST WALL AND LOWER NECK: Within normal limits.    ABDOMEN AND PELVIS:  LIVER: Steatosis. Scattered too small to characterize hypodensities  BILE DUCTS: Normal caliber.  GALLBLADDER: Within normal limits.  SPLEEN: Within normal limits.  PANCREAS: Within normal limits.  ADRENALS: Within normal limits.  KIDNEYS/URETERS: Bilateral renal cysts.    BLADDER: Within normal limits.  REPRODUCTIVE ORGANS: Unremarkable    BOWEL: No bowel obstruction. Colonic diverticula no diverticulitis. Appendix no appendicitis  PERITONEUM: No ascites.  VESSELS: Atherosclerotic nonaneurysmal. No dissection  RETROPERITONEUM/LYMPH NODES: No lymphadenopathy.    ABDOMINAL WALL: Fat-containing umbilical hernia  BONES: Within normal limits.    IMPRESSION:   Large acute pulmonary embolus main and right pulmonary artery as described. Suggestion of right ventricular strain.  No evidence of aortic dissection  Additional findings as discussed        < from: US Duplex Venous Lower Ext Complete, Bilateral (06.04.20 @ 14:56) >    INTERPRETATION:  CLINICAL INFORMATION: Pulmonary embolism.    COMPARISON: None available.    TECHNIQUE: Duplex sonography of the BILATERAL LOWER extremity veins with color and spectral Doppler, with and without compression.      FINDINGS:    Acute deep venous thrombosis in the left femoral, popliteal, posterior tibial, and peroneal veins.    There is normal compressibility of the right common femoral, femoral and popliteal veins.     Right popliteal cyst, measuring 2.2 x 0.6 x 1.3 cm.    IMPRESSION:   Acute deep venous thrombosis in the left lower extremity: above and below the knee.    No evidence of deep venous thrombosis in the right lower extremity.    Findings were discussed with Dr. Romero on 6/4/2020 at 2:59 PM by Dr. Segovia with read back confirmation.      Critical Care time: 35 mins assessing presenting problems of acute illness that poses high probability of life threatening deterioration or end organ damage/dysfunction.  Medical decision making inculding Initiating plan of care, reviewing data, reviewing radiology,direct patient bedside evaluation and interpretation of vital signs, any necessary ventilator management , discusion with multidisciplinary team, discussing goals of care with patient/family, all non inclusive of procedures

## 2020-06-06 LAB
ANION GAP SERPL CALC-SCNC: 13 MMOL/L — SIGNIFICANT CHANGE UP (ref 5–17)
ANION GAP SERPL CALC-SCNC: 15 MMOL/L — SIGNIFICANT CHANGE UP (ref 5–17)
APTT BLD: 41.5 SEC — HIGH (ref 27.5–36.3)
APTT BLD: 45.7 SEC — HIGH (ref 27.5–36.3)
APTT BLD: 76.8 SEC — HIGH (ref 27.5–36.3)
APTT BLD: 88 SEC — HIGH (ref 27.5–36.3)
BUN SERPL-MCNC: 11 MG/DL — SIGNIFICANT CHANGE UP (ref 8–20)
BUN SERPL-MCNC: 13 MG/DL — SIGNIFICANT CHANGE UP (ref 8–20)
CALCIUM SERPL-MCNC: 8.3 MG/DL — LOW (ref 8.6–10.2)
CALCIUM SERPL-MCNC: 8.3 MG/DL — LOW (ref 8.6–10.2)
CHLORIDE SERPL-SCNC: 102 MMOL/L — SIGNIFICANT CHANGE UP (ref 98–107)
CHLORIDE SERPL-SCNC: 103 MMOL/L — SIGNIFICANT CHANGE UP (ref 98–107)
CO2 SERPL-SCNC: 24 MMOL/L — SIGNIFICANT CHANGE UP (ref 22–29)
CO2 SERPL-SCNC: 24 MMOL/L — SIGNIFICANT CHANGE UP (ref 22–29)
CREAT SERPL-MCNC: 0.56 MG/DL — SIGNIFICANT CHANGE UP (ref 0.5–1.3)
CREAT SERPL-MCNC: 0.56 MG/DL — SIGNIFICANT CHANGE UP (ref 0.5–1.3)
FIBRINOGEN PPP-MCNC: 274 MG/DL — LOW (ref 300–520)
FIBRINOGEN PPP-MCNC: 358 MG/DL — SIGNIFICANT CHANGE UP (ref 300–520)
FIBRINOGEN PPP-MCNC: 378 MG/DL — SIGNIFICANT CHANGE UP (ref 300–520)
GLUCOSE SERPL-MCNC: 104 MG/DL — HIGH (ref 70–99)
GLUCOSE SERPL-MCNC: 109 MG/DL — HIGH (ref 70–99)
HCT VFR BLD CALC: 38.8 % — SIGNIFICANT CHANGE UP (ref 34.5–45)
HGB BLD-MCNC: 12.9 G/DL — SIGNIFICANT CHANGE UP (ref 11.5–15.5)
LACTATE SERPL-SCNC: 0.9 MMOL/L — SIGNIFICANT CHANGE UP (ref 0.5–2)
LACTATE SERPL-SCNC: 1.6 MMOL/L — SIGNIFICANT CHANGE UP (ref 0.5–2)
MAGNESIUM SERPL-MCNC: 1.8 MG/DL — SIGNIFICANT CHANGE UP (ref 1.6–2.6)
MAGNESIUM SERPL-MCNC: 1.8 MG/DL — SIGNIFICANT CHANGE UP (ref 1.8–2.6)
MCHC RBC-ENTMCNC: 33 PG — SIGNIFICANT CHANGE UP (ref 27–34)
MCHC RBC-ENTMCNC: 33.2 GM/DL — SIGNIFICANT CHANGE UP (ref 32–36)
MCV RBC AUTO: 99.2 FL — SIGNIFICANT CHANGE UP (ref 80–100)
NT-PROBNP SERPL-SCNC: 1376 PG/ML — HIGH (ref 0–300)
NT-PROBNP SERPL-SCNC: 1651 PG/ML — HIGH (ref 0–300)
PHOSPHATE SERPL-MCNC: 2.9 MG/DL — SIGNIFICANT CHANGE UP (ref 2.4–4.7)
PHOSPHATE SERPL-MCNC: 3.2 MG/DL — SIGNIFICANT CHANGE UP (ref 2.4–4.7)
PLATELET # BLD AUTO: 134 K/UL — LOW (ref 150–400)
POTASSIUM SERPL-MCNC: 3.8 MMOL/L — SIGNIFICANT CHANGE UP (ref 3.5–5.3)
POTASSIUM SERPL-MCNC: 3.8 MMOL/L — SIGNIFICANT CHANGE UP (ref 3.5–5.3)
POTASSIUM SERPL-SCNC: 3.8 MMOL/L — SIGNIFICANT CHANGE UP (ref 3.5–5.3)
POTASSIUM SERPL-SCNC: 3.8 MMOL/L — SIGNIFICANT CHANGE UP (ref 3.5–5.3)
RBC # BLD: 3.91 M/UL — SIGNIFICANT CHANGE UP (ref 3.8–5.2)
RBC # FLD: 12.9 % — SIGNIFICANT CHANGE UP (ref 10.3–14.5)
SODIUM SERPL-SCNC: 140 MMOL/L — SIGNIFICANT CHANGE UP (ref 135–145)
SODIUM SERPL-SCNC: 141 MMOL/L — SIGNIFICANT CHANGE UP (ref 135–145)
TROPONIN T SERPL-MCNC: 0.04 NG/ML — SIGNIFICANT CHANGE UP (ref 0–0.06)
WBC # BLD: 8 K/UL — SIGNIFICANT CHANGE UP (ref 3.8–10.5)
WBC # FLD AUTO: 8 K/UL — SIGNIFICANT CHANGE UP (ref 3.8–10.5)

## 2020-06-06 PROCEDURE — 99233 SBSQ HOSP IP/OBS HIGH 50: CPT

## 2020-06-06 RX ORDER — AMLODIPINE BESYLATE 2.5 MG/1
5 TABLET ORAL DAILY
Refills: 0 | Status: DISCONTINUED | OUTPATIENT
Start: 2020-06-06 | End: 2020-06-08

## 2020-06-06 RX ORDER — HEPARIN SODIUM 5000 [USP'U]/ML
3000 INJECTION INTRAVENOUS; SUBCUTANEOUS EVERY 6 HOURS
Refills: 0 | Status: DISCONTINUED | OUTPATIENT
Start: 2020-06-06 | End: 2020-06-07

## 2020-06-06 RX ORDER — OMEGA-3 ACID ETHYL ESTERS 1 G
1 CAPSULE ORAL DAILY
Refills: 0 | Status: DISCONTINUED | OUTPATIENT
Start: 2020-06-06 | End: 2020-06-08

## 2020-06-06 RX ORDER — HEPARIN SODIUM 5000 [USP'U]/ML
1500 INJECTION INTRAVENOUS; SUBCUTANEOUS
Qty: 25000 | Refills: 0 | Status: DISCONTINUED | OUTPATIENT
Start: 2020-06-06 | End: 2020-06-07

## 2020-06-06 RX ORDER — PREGABALIN 225 MG/1
1000 CAPSULE ORAL DAILY
Refills: 0 | Status: DISCONTINUED | OUTPATIENT
Start: 2020-06-06 | End: 2020-06-08

## 2020-06-06 RX ORDER — HEPARIN SODIUM 5000 [USP'U]/ML
6500 INJECTION INTRAVENOUS; SUBCUTANEOUS EVERY 6 HOURS
Refills: 0 | Status: DISCONTINUED | OUTPATIENT
Start: 2020-06-06 | End: 2020-06-07

## 2020-06-06 RX ORDER — APIXABAN 2.5 MG/1
10 TABLET, FILM COATED ORAL EVERY 12 HOURS
Refills: 0 | Status: DISCONTINUED | OUTPATIENT
Start: 2020-06-06 | End: 2020-06-08

## 2020-06-06 RX ADMIN — APIXABAN 10 MILLIGRAM(S): 2.5 TABLET, FILM COATED ORAL at 17:41

## 2020-06-06 RX ADMIN — CHLORHEXIDINE GLUCONATE 1 APPLICATION(S): 213 SOLUTION TOPICAL at 05:34

## 2020-06-06 RX ADMIN — HEPARIN SODIUM 1500 UNIT(S)/HR: 5000 INJECTION INTRAVENOUS; SUBCUTANEOUS at 05:32

## 2020-06-06 RX ADMIN — DORZOLAMIDE HYDROCHLORIDE 1 DROP(S): 20 SOLUTION/ DROPS OPHTHALMIC at 05:34

## 2020-06-06 RX ADMIN — LATANOPROST 1 DROP(S): 0.05 SOLUTION/ DROPS OPHTHALMIC; TOPICAL at 21:02

## 2020-06-06 RX ADMIN — Medication 1 GRAM(S): at 15:23

## 2020-06-06 RX ADMIN — PREGABALIN 1000 MICROGRAM(S): 225 CAPSULE ORAL at 16:10

## 2020-06-06 RX ADMIN — AMLODIPINE BESYLATE 5 MILLIGRAM(S): 2.5 TABLET ORAL at 17:41

## 2020-06-06 RX ADMIN — ATORVASTATIN CALCIUM 20 MILLIGRAM(S): 80 TABLET, FILM COATED ORAL at 21:01

## 2020-06-06 RX ADMIN — HEPARIN SODIUM 1500 UNIT(S)/HR: 5000 INJECTION INTRAVENOUS; SUBCUTANEOUS at 12:29

## 2020-06-06 RX ADMIN — Medication 1 TABLET(S): at 16:10

## 2020-06-06 NOTE — PROGRESS NOTE ADULT - ASSESSMENT
INCOMPLETE Patient was admitted as a transfer from [facility] with Acute Submassive PE with RV Strain + LLE DVT. Seen by Cardiology and was offered PE study at [facility] (peripheral low dose systemic TPA vs ultrasound assisted catheter directed equivalent dose thrombolysis for treatment of submassive pulmonary embolism) and she agreed for that. She was randomized to peripheral TPA which she finished at 3 am, currently on Heparin Infusion.     1) Acute Submassive PE with RV Strain  - Enrolled in PE study (randomized to peripheral TPA)  - Finished TPA in am  - Continue Heparin Infusion, will switch to Eliquis   - Repeat TTE in am  - Cardiology following  2) LLE DVT  - Continue Heparin Infusion, will switch to Eliquis   3) Alcoholism  - On CIWA Protocol  - CIWA Score stable  4) Thrombocytopenia  - Unknown baseline  - ? secondary to Alcoholism  - No signs of bleeding  - Monitor  5) Elevated LFTs  - Unknown baseline  - secondary to ? hepatic congestion vs alcoholism  - Monitor    6) HTN  - Resume Amlodipine 5 mg daily  7) HLD  - Continue Lipitor and Lovaza    8) Glaucoma  - Continue Latanoprost and Dorzolamide eye drops  DVT Prophylaxis -- Patient is on Heparin Infusion    Dispo: Home in 48 hours

## 2020-06-06 NOTE — PROGRESS NOTE ADULT - SUBJECTIVE AND OBJECTIVE BOX
ICU DOWNGRADE / TRANSFER NOTE    Other pulmonary embolism without acute cor pulmonale    HPI:  Patient is 69 F PMH HTN, HLD, and glaucoma presented to [facility] this morning diagnosed with an acute submassive saddle PE with RV strain, as well as LLE DVT femoral, popliteal, posterior tibial, and peroneal v. BIBA to [place] following two episodes of witnessed syncopal episodes at home. Transferred to [facility] for EKOS treatment. Denies trauma to head during syncopal episodes. Denies chest pain, palpitations, dyspnea, cough, fever, chills, n/v/d, abd pain, dysuria, headaches, weakness, dizziness, confusion, changes in vision. (04 Jun 2020 21:37)    Hospital Course:  INCOMPLETE    Interval History:  Patient was seen and examined at bedside. Feeling better.   Denies chest pain, palpitations, shortness of breath, headache, dizziness, visual symptoms, anxiety, hallucinations, tremors, nausea, vomiting or abdominal pain.  CIWA Score has been stable.     ROS:  As per interval history otherwise unremarkable.    PHYSICAL EXAM:  Vital Signs   T(C): 36.9 (06 Jun 2020 08:01), Max: 37.5 (06 Jun 2020 00:01)  T(F): 98.5 (06 Jun 2020 08:01), Max: 99.5 (06 Jun 2020 00:01)  HR: 66 (06 Jun 2020 13:00) (50 - 70)  BP: 126/69 (06 Jun 2020 13:00) (126/69 - 167/85)  BP(mean): 92 (06 Jun 2020 13:00) (74 - 119)  RR: 21 (06 Jun 2020 13:00) (16 - 39)  SpO2: 91% (06 Jun 2020 13:00) (90% - 98%)  General: Well developed. Well nourished. No acute distress  HEENT: EOMI. Clear conjunctivae. Moist mucus membrane  Neck: Supple.   Chest: CTA bilaterally - no wheezing, rales or rhonchi.   Heart: Normal S1 & S2. RRR.   Abdomen: Soft. Non-tender. Non-distended. + BS  Ext: No pedal edema. No calf tenderness   Neuro: AAO x 3. No focal deficit. No speech disorder  Skin: Warm and Dry  Psychiatry: Normal mood and affect    I&O's Summary    05 Jun 2020 07:01  -  06 Jun 2020 07:00  --------------------------------------------------------  IN: 1110.5 mL / OUT: 50 mL / NET: 1060.5 mL    06 Jun 2020 07:01  -  06 Jun 2020 13:27  --------------------------------------------------------  IN: 600 mL / OUT: 0 mL / NET: 600 mL    LABS:                        12.9   8.00  )-----------( 134      ( 06 Jun 2020 02:28 )             38.8     06-06    140  |  103  |  11.0  ----------------------------<  109<H>  3.8   |  24.0  |  0.56    Ca    8.3<L>      06 Jun 2020 02:28  Phos  3.2     06-06  Mg     1.8     06-06    TPro  6.3<L>  /  Alb  3.7  /  TBili  0.6  /  DBili  x   /  AST  54<H>  /  ALT  69<H>  /  AlkPhos  76  06-05    PT/INR - ( 04 Jun 2020 21:25 )   PT: 11.6 sec;   INR: 1.03 ratio         PTT - ( 06 Jun 2020 09:34 )  PTT:76.8 sec      RADIOLOGY & ADDITIONAL STUDIES:  Reviewed     MEDICATIONS  (STANDING):  atorvastatin 20 milliGRAM(s) Oral at bedtime  chlorhexidine 2% Cloths 1 Application(s) Topical <User Schedule>  cyanocobalamin 1000 MICROGram(s) Oral daily  dorzolamide 2% Ophthalmic Solution 1 Drop(s) Both EYES <User Schedule>  heparin  Infusion. 1500 Unit(s)/Hr (15 mL/Hr) IV Continuous <Continuous>  latanoprost 0.005% Ophthalmic Solution 1 Drop(s) Both EYES at bedtime  multivitamin 1 Tablet(s) Oral daily  omega-3-Acid Ethyl Esters 1 Gram(s) Oral daily    MEDICATIONS  (PRN):  acetaminophen   Tablet .. 650 milliGRAM(s) Oral every 6 hours PRN Mild Pain (1 - 3)  heparin   Injectable 6500 Unit(s) IV Push every 6 hours PRN For aPTT less than 40  heparin   Injectable 3000 Unit(s) IV Push every 6 hours PRN For aPTT between 40 - 57  LORazepam   Injectable 1 milliGRAM(s) IV Push every 2 hours PRN CIWA-Ar score increase by 2 points and a total score of 7 or less ICU DOWNGRADE / TRANSFER NOTE    Other pulmonary embolism without acute cor pulmonale    HPI:  Patient is 69 F PMH HTN, HLD, and glaucoma presented to [facility] this morning diagnosed with an acute submassive saddle PE with RV strain, as well as LLE DVT femoral, popliteal, posterior tibial, and peroneal v. BIBA to [place] following two episodes of witnessed syncopal episodes at home. Transferred to [facility] for EKOS treatment. Denies trauma to head during syncopal episodes. Denies chest pain, palpitations, dyspnea, cough, fever, chills, n/v/d, abd pain, dysuria, headaches, weakness, dizziness, confusion, changes in vision. (04 Jun 2020 21:37)    Hospital Course:  Patient was admitted as a transfer from [facility] with Acute Submassive PE with RV Strain + LLE DVT. Seen by Cardiology and was offered PE study at [facility] (peripheral low dose systemic TPA vs ultrasound assisted catheter directed equivalent dose thrombolysis for treatment of submassive pulmonary embolism) and she agreed for that. She was randomized to peripheral TPA which she finished at 3 am, currently on Heparin Infusion.     Interval History:  Patient was seen and examined at bedside. Feeling better.   Denies chest pain, palpitations, shortness of breath, headache, dizziness, visual symptoms, anxiety, hallucinations, tremors, nausea, vomiting or abdominal pain.  CIWA Score has been stable.     ROS:  As per interval history otherwise unremarkable.    PHYSICAL EXAM:  Vital Signs   T(C): 36.9 (06 Jun 2020 08:01), Max: 37.5 (06 Jun 2020 00:01)  T(F): 98.5 (06 Jun 2020 08:01), Max: 99.5 (06 Jun 2020 00:01)  HR: 66 (06 Jun 2020 13:00) (50 - 70)  BP: 126/69 (06 Jun 2020 13:00) (126/69 - 167/85)  BP(mean): 92 (06 Jun 2020 13:00) (74 - 119)  RR: 21 (06 Jun 2020 13:00) (16 - 39)  SpO2: 91% (06 Jun 2020 13:00) (90% - 98%)  General: Well developed. Well nourished. No acute distress  HEENT: EOMI. Clear conjunctivae. Moist mucus membrane  Neck: Supple.   Chest: CTA bilaterally - no wheezing, rales or rhonchi.   Heart: Normal S1 & S2. RRR.   Abdomen: Soft. Non-tender. Non-distended. + BS  Ext: No pedal edema. No calf tenderness   Neuro: AAO x 3. No focal deficit. No speech disorder  Skin: Warm and Dry  Psychiatry: Normal mood and affect    I&O's Summary    05 Jun 2020 07:01  -  06 Jun 2020 07:00  --------------------------------------------------------  IN: 1110.5 mL / OUT: 50 mL / NET: 1060.5 mL    06 Jun 2020 07:01  -  06 Jun 2020 13:27  --------------------------------------------------------  IN: 600 mL / OUT: 0 mL / NET: 600 mL    LABS:                        12.9   8.00  )-----------( 134      ( 06 Jun 2020 02:28 )             38.8     06-06    140  |  103  |  11.0  ----------------------------<  109<H>  3.8   |  24.0  |  0.56    Ca    8.3<L>      06 Jun 2020 02:28  Phos  3.2     06-06  Mg     1.8     06-06    TPro  6.3<L>  /  Alb  3.7  /  TBili  0.6  /  DBili  x   /  AST  54<H>  /  ALT  69<H>  /  AlkPhos  76  06-05    PT/INR - ( 04 Jun 2020 21:25 )   PT: 11.6 sec;   INR: 1.03 ratio         PTT - ( 06 Jun 2020 09:34 )  PTT:76.8 sec      RADIOLOGY & ADDITIONAL STUDIES:  Reviewed     MEDICATIONS  (STANDING):  atorvastatin 20 milliGRAM(s) Oral at bedtime  chlorhexidine 2% Cloths 1 Application(s) Topical <User Schedule>  cyanocobalamin 1000 MICROGram(s) Oral daily  dorzolamide 2% Ophthalmic Solution 1 Drop(s) Both EYES <User Schedule>  heparin  Infusion. 1500 Unit(s)/Hr (15 mL/Hr) IV Continuous <Continuous>  latanoprost 0.005% Ophthalmic Solution 1 Drop(s) Both EYES at bedtime  multivitamin 1 Tablet(s) Oral daily  omega-3-Acid Ethyl Esters 1 Gram(s) Oral daily    MEDICATIONS  (PRN):  acetaminophen   Tablet .. 650 milliGRAM(s) Oral every 6 hours PRN Mild Pain (1 - 3)  heparin   Injectable 6500 Unit(s) IV Push every 6 hours PRN For aPTT less than 40  heparin   Injectable 3000 Unit(s) IV Push every 6 hours PRN For aPTT between 40 - 57  LORazepam   Injectable 1 milliGRAM(s) IV Push every 2 hours PRN CIWA-Ar score increase by 2 points and a total score of 7 or less ICU DOWNGRADE / TRANSFER NOTE    Other pulmonary embolism without acute cor pulmonale    HPI:  Patient is 69 F PMH HTN, HLD, and glaucoma presented to [facility] this morning diagnosed with an acute submassive saddle PE with RV strain, as well as LLE DVT femoral, popliteal, posterior tibial, and peroneal v. BIBA to [place] following two episodes of witnessed syncopal episodes at home. Transferred to [facility] for EKOS treatment. Denies trauma to head during syncopal episodes. Denies chest pain, palpitations, dyspnea, cough, fever, chills, n/v/d, abd pain, dysuria, headaches, weakness, dizziness, confusion, changes in vision. (04 Jun 2020 21:37)    Hospital Course:  Patient was admitted as a transfer from [facility] with Acute Submassive PE with RV Strain + LLE DVT. Seen by Cardiology and was offered PE study at [facility] (peripheral low dose systemic TPA vs ultrasound assisted catheter directed equivalent dose thrombolysis for treatment of submassive pulmonary embolism) and she agreed for that. She was randomized to peripheral TPA which she finished at 3 am, currently on Heparin Infusion.     Interval History:  Patient was seen and examined at bedside. Feeling better.   Denies chest pain, palpitations, shortness of breath, headache, dizziness, visual symptoms, anxiety, hallucinations, tremors, nausea, vomiting or abdominal pain.  CIWA Score has been stable.     ROS:  As per interval history otherwise unremarkable.    PHYSICAL EXAM:  Vital Signs   T(C): 36.9 (06 Jun 2020 08:01), Max: 37.5 (06 Jun 2020 00:01)  T(F): 98.5 (06 Jun 2020 08:01), Max: 99.5 (06 Jun 2020 00:01)  HR: 66 (06 Jun 2020 13:00) (50 - 70)  BP: 126/69 (06 Jun 2020 13:00) (126/69 - 167/85)  BP(mean): 92 (06 Jun 2020 13:00) (74 - 119)  RR: 21 (06 Jun 2020 13:00) (16 - 39)  SpO2: 91% (06 Jun 2020 13:00) (90% - 98%)  General: Well developed. Well nourished. No acute distress  HEENT: EOMI. Clear conjunctivae. Moist mucus membrane  Neck: Supple.   Chest: CTA bilaterally - no wheezing, rales or rhonchi.   Heart: Normal S1 & S2. RRR.   Abdomen: Soft. Non-tender. Non-distended. + BS  Ext: No pedal edema. No calf tenderness   Neuro: AAO x 3. No focal deficit. No speech disorder  Skin: Warm and Dry  Psychiatry: Normal mood and affect    I&O's Summary    05 Jun 2020 07:01  -  06 Jun 2020 07:00  --------------------------------------------------------  IN: 1110.5 mL / OUT: 50 mL / NET: 1060.5 mL    06 Jun 2020 07:01  -  06 Jun 2020 13:27  --------------------------------------------------------  IN: 600 mL / OUT: 0 mL / NET: 600 mL    LABS:                        12.9   8.00  )-----------( 134      ( 06 Jun 2020 02:28 )             38.8     06-06    140  |  103  |  11.0  ----------------------------<  109<H>  3.8   |  24.0  |  0.56    Ca    8.3<L>      06 Jun 2020 02:28  Phos  3.2     06-06  Mg     1.8     06-06    TPro  6.3<L>  /  Alb  3.7  /  TBili  0.6  /  DBili  x   /  AST  54<H>  /  ALT  69<H>  /  AlkPhos  76  06-05    PT/INR - ( 04 Jun 2020 21:25 )   PT: 11.6 sec;   INR: 1.03 ratio         PTT - ( 06 Jun 2020 09:34 )  PTT:76.8 sec      RADIOLOGY & ADDITIONAL STUDIES:  Reviewed     MEDICATIONS  (STANDING):  atorvastatin 20 milliGRAM(s) Oral at bedtime  chlorhexidine 2% Cloths 1 Application(s) Topical <User Schedule>  cyanocobalamin 1000 MICROGram(s) Oral daily  dorzolamide 2% Ophthalmic Solution 1 Drop(s) Both EYES <User Schedule>  heparin  Infusion. 1500 Unit(s)/Hr (15 mL/Hr) IV Continuous <Continuous>  latanoprost 0.005% Ophthalmic Solution 1 Drop(s) Both EYES at bedtime  multivitamin 1 Tablet(s) Oral daily  omega-3-Acid Ethyl Esters 1 Gram(s) Oral daily    MEDICATIONS  (PRN):  acetaminophen   Tablet .. 650 milliGRAM(s) Oral every 6 hours PRN Mild Pain (1 - 3)  heparin   Injectable 6500 Unit(s) IV Push every 6 hours PRN For aPTT less than 40  heparin   Injectable 3000 Unit(s) IV Push every 6 hours PRN For aPTT between 40 - 57  LORazepam   Injectable 1 milliGRAM(s) IV Push every 2 hours PRN CIWA-Ar score increase by 2 points and a total score of 7 or less

## 2020-06-06 NOTE — PROGRESS NOTE ADULT - ASSESSMENT
Patient is a 69y old Female PMHx of HTN, HLD, and bowel resection presenting w/ 2 acute episodes of syncope dx at [facility], found to have    1. Acute submassive PE 6/4/2020.   2. LE DVT  3. Essential HTN  4. alcohol abuse         Plan:  1. Acute submassive PE 6/4/2020.   - PE with evidence of right heart strain  - enrolled in EKOS study  - randomized to peripheral TPA   - finished infusion at 3am this morning  - HD stable, no signs of bleeding  - Continue full dose heparin ggt  - OOB to chair as able   - repeat TTE tomorrow 6/7      2. LE DVT  - full AC with heparin ggt  - titrate per nomogram     3. Essential HTN  - cont lipitor   - restart norvasc when able  - holding in the acute phase, of submassive PE     4. alcohol abuse   - drinks one bottle of wine per day   - will put on CIWA and ativan taper       Dispo: Transfer to [facility], sign out given to Dr. Perdomo Patient is a 69y old Female PMHx of HTN, HLD, and bowel resection presenting w/ 2 acute episodes of syncope dx at [facility], found to have    1. Acute submassive PE 6/4/2020.   2. LE DVT  3. Essential HTN  4. alcohol abuse         Plan:  1. Acute submassive PE 6/4/2020.   - PE with evidence of right heart strain  - enrolled in EKOS study  - randomized to peripheral TPA   - finished infusion at 3am this morning  - HD stable, no signs of bleeding  - Continue full dose heparin ggt  - OOB to chair as able   - repeat TTE tomorrow 6/7      2. LE DVT  - full AC with heparin ggt  - titrate per nomogram     3. Essential HTN  - cont lipitor   - restart norvasc when able  - holding in the acute phase, of submassive PE     4. alcohol abuse   - drinks one bottle of wine per day   - will put on CIWA and ativan taper       Will order PT    Dispo: Transfer to [facility], sign out given to Dr. Perdomo

## 2020-06-06 NOTE — PROGRESS NOTE ADULT - SUBJECTIVE AND OBJECTIVE BOX
Patient is a 69y old  Female who presents with a chief complaint of Acute submassive PE (05 Jun 2020 13:27)      BRIEF HOSPITAL COURSE: ***    Events last 24 hours: ***    PAST MEDICAL & SURGICAL HISTORY:  HLD (hyperlipidemia)  HTN (hypertension)  History of bowel resection    Allergies    No Known Allergies    Intolerances    Decaf tea TID (Unknown)    FAMILY HISTORY:      Review of Systems:  CONSTITUTIONAL: No fever, chills, or fatigue  EYES: No eye pain, visual disturbances, or discharge  ENMT:  No difficulty hearing, tinnitus, vertigo; No sinus or throat pain  NECK: No pain or stiffness  RESPIRATORY: No cough, wheezing, chills or hemoptysis; No shortness of breath  CARDIOVASCULAR: No chest pain, palpitations, dizziness, or leg swelling  GASTROINTESTINAL: No abdominal or epigastric pain. No nausea, vomiting, or hematemesis; No diarrhea or constipation. No melena or hematochezia.  GENITOURINARY: No dysuria, frequency, hematuria, or incontinence  NEUROLOGICAL: No headaches, memory loss, loss of strength, numbness, or tremors  SKIN: No itching, burning, rashes, or lesions   MUSCULOSKELETAL: No joint pain or swelling; No muscle, back, or extremity pain  PSYCHIATRIC: No depression, anxiety, mood swings, or difficulty sleeping      Medications:        acetaminophen   Tablet .. 650 milliGRAM(s) Oral every 6 hours PRN  LORazepam   Injectable 1 milliGRAM(s) IV Push every 2 hours PRN      alteplase    Infusion 2 mG/Hr IV Continuous <Continuous>  heparin   Injectable 6500 Unit(s) IV Push every 6 hours PRN  heparin   Injectable 3000 Unit(s) IV Push every 6 hours PRN  heparin  Infusion. 1500 Unit(s)/Hr IV Continuous <Continuous>        atorvastatin 20 milliGRAM(s) Oral at bedtime        chlorhexidine 2% Cloths 1 Application(s) Topical <User Schedule>  dorzolamide 2% Ophthalmic Solution 1 Drop(s) Both EYES <User Schedule>  latanoprost 0.005% Ophthalmic Solution 1 Drop(s) Both EYES at bedtime            ICU Vital Signs Last 24 Hrs  T(C): 36.9 (06 Jun 2020 08:01), Max: 37.5 (06 Jun 2020 00:01)  T(F): 98.5 (06 Jun 2020 08:01), Max: 99.5 (06 Jun 2020 00:01)  HR: 60 (06 Jun 2020 10:00) (50 - 70)  BP: 148/70 (06 Jun 2020 09:00) (127/71 - 167/85)  BP(mean): 100 (06 Jun 2020 09:00) (74 - 119)  ABP: --  ABP(mean): --  RR: 26 (06 Jun 2020 10:00) (16 - 39)  SpO2: 95% (06 Jun 2020 10:00) (90% - 98%)    Vital Signs Last 24 Hrs  T(C): 36.9 (06 Jun 2020 08:01), Max: 37.5 (06 Jun 2020 00:01)  T(F): 98.5 (06 Jun 2020 08:01), Max: 99.5 (06 Jun 2020 00:01)  HR: 60 (06 Jun 2020 10:00) (50 - 70)  BP: 148/70 (06 Jun 2020 09:00) (127/71 - 167/85)  BP(mean): 100 (06 Jun 2020 09:00) (74 - 119)  RR: 26 (06 Jun 2020 10:00) (16 - 39)  SpO2: 95% (06 Jun 2020 10:00) (90% - 98%)        I&O's Detail    05 Jun 2020 07:01  -  06 Jun 2020 07:00  --------------------------------------------------------  IN:    alteplase Infusion: 500 mL    heparin  Infusion.: 66 mL    heparin  Infusion.: 15 mL    heparin Infusion: 99.5 mL    Oral Fluid: 430 mL  Total IN: 1110.5 mL    OUT:    Voided: 50 mL  Total OUT: 50 mL    Total NET: 1060.5 mL            LABS:                        12.9   8.00  )-----------( 134      ( 06 Jun 2020 02:28 )             38.8     06-06    140  |  103  |  11.0  ----------------------------<  109<H>  3.8   |  24.0  |  0.56    Ca    8.3<L>      06 Jun 2020 02:28  Phos  3.2     06-06  Mg     1.8     06-06    TPro  6.3<L>  /  Alb  3.7  /  TBili  0.6  /  DBili  x   /  AST  54<H>  /  ALT  69<H>  /  AlkPhos  76  06-05      CARDIAC MARKERS ( 06 Jun 2020 02:28 )  x     / 0.04 ng/mL / x     / x     / x      CARDIAC MARKERS ( 05 Jun 2020 22:28 )  x     / 0.04 ng/mL / x     / x     / x      CARDIAC MARKERS ( 05 Jun 2020 18:49 )  x     / 0.04 ng/mL / x     / x     / x      CARDIAC MARKERS ( 04 Jun 2020 19:57 )  x     / 0.15 ng/mL / x     / x     / x      CARDIAC MARKERS ( 04 Jun 2020 15:39 )  1.150 ng/mL / x     / 96 U/L / x     / 4.7 ng/mL      CAPILLARY BLOOD GLUCOSE      POCT Blood Glucose.: 114 mg/dL (04 Jun 2020 20:03)    PT/INR - ( 04 Jun 2020 21:25 )   PT: 11.6 sec;   INR: 1.03 ratio         PTT - ( 06 Jun 2020 09:34 )  PTT:76.8 sec    CULTURES:      Physical Examination:    General: No acute distress.  Alert, oriented, interactive, nonfocal    HEENT: Pupils equal, reactive to light.  Symmetric.    PULM: Clear to auscultation bilaterally, no significant sputum production    CVS: Regular rate and rhythm, no murmurs, rubs, or gallops    ABD: Soft, nondistended, nontender, normoactive bowel sounds, no masses    EXT: No edema, nontender    SKIN: Warm and well perfused, no rashes noted.    RADIOLOGY: ***    CRITICAL CARE TIME SPENT: *** Patient is a 69y old  Female who presents with a chief complaint of Acute submassive PE (05 Jun 2020 13:27)      BRIEF HOSPITAL COURSE:   Patient is a 69y old Female PMHx of HTN, HLD, and bowel resection presenting from [facility] after experiencing 2 episodes of syncope. PT dx w/ an acute submassive saddle PE w/ RV strain and LLE femoral, popliteal, posterior tibial, and peroneal DVT. Pt was then transferred to [facility] for EKOS evaluation. Of note patient had recent traumatic fall on 5/11/2020, resulting in a hospitalization. Pt denies any trauma to the head during syncopal episodes, chest pain, palpations, dyspnea, cough, fever, chills, n/v/d, abd pain, dysuria, HA, dizziness or confusion.       Events last 24 hours:   - finished TPA admin  - endorsing some back stiffness       PAST MEDICAL & SURGICAL HISTORY:  HLD (hyperlipidemia)  HTN (hypertension)  History of bowel resection    Allergies    No Known Allergies    Intolerances    Decaf tea TID (Unknown)    FAMILY HISTORY:  denies    Social:  drinks 1 bottle of wine per day     Review of Systems:  CONSTITUTIONAL: No fever, chills, or fatigue  EYES: No eye pain, visual disturbances, or discharge  ENMT:  No difficulty hearing, tinnitus, vertigo; No sinus or throat pain  NECK: No pain or stiffness  RESPIRATORY: No cough, wheezing, chills or hemoptysis; No shortness of breath  CARDIOVASCULAR: No chest pain, palpitations, dizziness, or leg swelling  GASTROINTESTINAL: No abdominal or epigastric pain. No nausea, vomiting, or hematemesis; No diarrhea or constipation. No melena or hematochezia.  GENITOURINARY: No dysuria, frequency, hematuria, or incontinence  NEUROLOGICAL: No headaches, memory loss, loss of strength, numbness, or tremors  SKIN: No itching, burning, rashes, or lesions   MUSCULOSKELETAL: No joint pain or swelling; No muscle, + back stiffness, or extremity pain  PSYCHIATRIC: No depression, anxiety, mood swings, or difficulty sleeping      Medications:  acetaminophen   Tablet .. 650 milliGRAM(s) Oral every 6 hours PRN  LORazepam   Injectable 1 milliGRAM(s) IV Push every 2 hours PRN  alteplase    Infusion 2 mG/Hr IV Continuous <Continuous>  heparin   Injectable 6500 Unit(s) IV Push every 6 hours PRN  heparin   Injectable 3000 Unit(s) IV Push every 6 hours PRN  heparin  Infusion. 1500 Unit(s)/Hr IV Continuous <Continuous>  atorvastatin 20 milliGRAM(s) Oral at bedtime  chlorhexidine 2% Cloths 1 Application(s) Topical <User Schedule>  dorzolamide 2% Ophthalmic Solution 1 Drop(s) Both EYES <User Schedule>  latanoprost 0.005% Ophthalmic Solution 1 Drop(s) Both EYES at bedtime      ICU Vital Signs Last 24 Hrs  T(C): 36.9 (06 Jun 2020 08:01), Max: 37.5 (06 Jun 2020 00:01)  T(F): 98.5 (06 Jun 2020 08:01), Max: 99.5 (06 Jun 2020 00:01)  HR: 60 (06 Jun 2020 10:00) (50 - 70)  BP: 148/70 (06 Jun 2020 09:00) (127/71 - 167/85)  BP(mean): 100 (06 Jun 2020 09:00) (74 - 119)  RR: 26 (06 Jun 2020 10:00) (16 - 39)  SpO2: 95% (06 Jun 2020 10:00) (90% - 98%)        I&O's Detail    05 Jun 2020 07:01  -  06 Jun 2020 07:00  --------------------------------------------------------  IN:    alteplase Infusion: 500 mL    heparin  Infusion.: 66 mL    heparin  Infusion.: 15 mL    heparin Infusion: 99.5 mL    Oral Fluid: 430 mL  Total IN: 1110.5 mL    OUT:    Voided: 50 mL  Total OUT: 50 mL    Total NET: 1060.5 mL            LABS:                        12.9   8.00  )-----------( 134      ( 06 Jun 2020 02:28 )             38.8     06-06    140  |  103  |  11.0  ----------------------------<  109<H>  3.8   |  24.0  |  0.56    Ca    8.3<L>      06 Jun 2020 02:28  Phos  3.2     06-06  Mg     1.8     06-06    TPro  6.3<L>  /  Alb  3.7  /  TBili  0.6  /  DBili  x   /  AST  54<H>  /  ALT  69<H>  /  AlkPhos  76  06-05      CARDIAC MARKERS ( 06 Jun 2020 02:28 )  x     / 0.04 ng/mL / x     / x     / x      CARDIAC MARKERS ( 05 Jun 2020 22:28 )  x     / 0.04 ng/mL / x     / x     / x      CARDIAC MARKERS ( 05 Jun 2020 18:49 )  x     / 0.04 ng/mL / x     / x     / x      CARDIAC MARKERS ( 04 Jun 2020 19:57 )  x     / 0.15 ng/mL / x     / x     / x      CARDIAC MARKERS ( 04 Jun 2020 15:39 )  1.150 ng/mL / x     / 96 U/L / x     / 4.7 ng/mL      CAPILLARY BLOOD GLUCOSE      POCT Blood Glucose.: 114 mg/dL (04 Jun 2020 20:03)    PT/INR - ( 04 Jun 2020 21:25 )   PT: 11.6 sec;   INR: 1.03 ratio         PTT - ( 06 Jun 2020 09:34 )  PTT:76.8 sec    CULTURES:      Physical Examination:    General: No acute distress.  Alert, oriented, interactive, nonfocal    HEENT: Pupils equal, reactive to light.  Symmetric.    PULM: Clear to auscultation bilaterally, no significant sputum production    CVS: Regular rate and rhythm, no murmurs, rubs, or gallops    ABD: Soft, nondistended, nontender, normoactive bowel sounds, no masses    EXT: B/L LE edema, nontender    SKIN: Warm and well perfused, no rashes noted.    Neuro: alert and oriented x3, moves all extr      RADIOLOGY:   < from: CT Angio Chest w/ IV Cont (06.04.20 @ 11:39) >  INTERPRETATION:  CLINICAL INFORMATION: Syncope    COMPARISON: None.    PROCEDURE:   CT Angiography of the Chest, Abdomen and Pelvis.   Precontrast imaging was performed through the chest followed by arterial phase imaging of the chest, abdomen and pelvis.  Intravenous contrast: 90 ml Omnipaque 350. 10 ml discarded.  Oral contrast: None.  Sagittal and coronal reformats were performed as well as 3D (MIP) reconstructions.    FINDINGS:  CHEST:   LUNGS AND LARGE AIRWAYS: Patent central airways. No pulmonary nodules.  PLEURA: No pleural effusion.  VESSELS: Satisfactory contrast bolus. Large clot in the main and right pulmonary artery consistent with acute pulmonary embolus. No evidence of aortic dissection.  HEART: Relative enlargement of the right ventricle suggesting a component of right ventricular strain No pericardial effusion.  MEDIASTINUM AND PHILIP: No lymphadenopathy.  CHEST WALL AND LOWER NECK: Within normal limits.    ABDOMEN AND PELVIS:  LIVER: Steatosis. Scattered too small to characterize hypodensities  BILE DUCTS: Normal caliber.  GALLBLADDER: Within normal limits.  SPLEEN: Within normal limits.  PANCREAS: Within normal limits.  ADRENALS: Within normal limits.  KIDNEYS/URETERS: Bilateral renal cysts.    BLADDER: Within normal limits.  REPRODUCTIVE ORGANS: Unremarkable    BOWEL: No bowel obstruction. Colonic diverticula no diverticulitis. Appendix no appendicitis  PERITONEUM: No ascites.  VESSELS: Atherosclerotic nonaneurysmal. No dissection  RETROPERITONEUM/LYMPH NODES: No lymphadenopathy.    ABDOMINAL WALL: Fat-containing umbilical hernia  BONES: Within normal limits.    IMPRESSION:   Large acute pulmonary embolus main and right pulmonary artery as described. Suggestion of right ventricular strain.  No evidence of aortic dissection  Additional findings as discussed        < from: US Duplex Venous Lower Ext Complete, Bilateral (06.04.20 @ 14:56) >    INTERPRETATION:  CLINICAL INFORMATION: Pulmonary embolism.    COMPARISON: None available.    TECHNIQUE: Duplex sonography of the BILATERAL LOWER extremity veins with color and spectral Doppler, with and without compression.      FINDINGS:    Acute deep venous thrombosis in the left femoral, popliteal, posterior tibial, and peroneal veins.    There is normal compressibility of the right common femoral, femoral and popliteal veins.     Right popliteal cyst, measuring 2.2 x 0.6 x 1.3 cm.    IMPRESSION:   Acute deep venous thrombosis in the left lower extremity: above and below the knee.    No evidence of deep venous thrombosis in the right lower extremity.    Findings were discussed with Dr. Romero on 6/4/2020 at 2:59 PM by Dr. Segovia with read back confirmation.

## 2020-06-06 NOTE — PROGRESS NOTE ADULT - SUBJECTIVE AND OBJECTIVE BOX
[facility]  [address], [place] [address]    REASON FOR VISIT:  Follow up on PE  UPDATE:  Doing ok sitting in chair  02 sat on RA 96-  TELEMETRY MONITORING:  reviewed, Nsr    CARDIAC MARKERS ( 06 Jun 2020 02:28 )  x     / 0.04 ng/mL / x     / x     / x      CARDIAC MARKERS ( 05 Jun 2020 22:28 )  x     / 0.04 ng/mL / x     / x     / x      CARDIAC MARKERS ( 05 Jun 2020 18:49 )  x     / 0.04 ng/mL / x     / x     / x      CARDIAC MARKERS ( 04 Jun 2020 19:57 )  x     / 0.15 ng/mL / x     / x     / x      CARDIAC MARKERS ( 04 Jun 2020 15:39 )  1.150 ng/mL / x     / 96 U/L / x     / 4.7 ng/mL      06-06    140  |  103  |  11.0  ----------------------------<  109<H>  3.8   |  24.0  |  0.56    Ca    8.3<L>      06 Jun 2020 02:28  Phos  3.2     06-06  Mg     1.8     06-06    TPro  6.3<L>  /  Alb  3.7  /  TBili  0.6  /  DBili  x   /  AST  54<H>  /  ALT  69<H>  /  AlkPhos  76  06-05    LIVER FUNCTIONS - ( 05 Jun 2020 04:51 )  Alb: 3.7 g/dL / Pro: 6.3 g/dL / ALK PHOS: 76 U/L / ALT: 69 U/L / AST: 54 U/L / GGT: x             MEDICATIONS  (STANDING):  atorvastatin 20 milliGRAM(s) Oral at bedtime  chlorhexidine 2% Cloths 1 Application(s) Topical <User Schedule>  cyanocobalamin 1000 MICROGram(s) Oral daily  dorzolamide 2% Ophthalmic Solution 1 Drop(s) Both EYES <User Schedule>  heparin  Infusion. 1500 Unit(s)/Hr (15 mL/Hr) IV Continuous <Continuous>  latanoprost 0.005% Ophthalmic Solution 1 Drop(s) Both EYES at bedtime  multivitamin 1 Tablet(s) Oral daily  omega-3-Acid Ethyl Esters 1 Gram(s) Oral daily    ROS:    No fever chills  Cardiac Mild sob with exertion  Resp  no cough no mucus production  Gi  no abd pain no melana  Ext No calf tenderness, no edema    Vital Signs Last 24 Hrs  T(C): 36.9 (06 Jun 2020 08:01), Max: 37.5 (06 Jun 2020 00:01)  T(F): 98.5 (06 Jun 2020 08:01), Max: 99.5 (06 Jun 2020 00:01)  HR: 58 (06 Jun 2020 11:00) (50 - 70)  BP: 145/70 (06 Jun 2020 11:00) (127/71 - 167/85)  BP(mean): 98 (06 Jun 2020 11:00) (74 - 119)  RR: 27 (06 Jun 2020 11:00) (16 - 39)  SpO2: 97% (06 Jun 2020 11:00) (90% - 98%)  T(C): 36.9 (06-06-20 @ 08:01), Max: 37.5 (06-06-20 @ 00:01)  HR: 58 (06-06-20 @ 11:00) (50 - 70)  BP: 145/70 (06-06-20 @ 11:00) (127/71 - 167/85)  RR: 27 (06-06-20 @ 11:00) (16 - 39)  SpO2: 97% (06-06-20 @ 11:00) (90% - 98%)    HEENT Head atraumatic eomi, oral mucosa moist  CV S1&S2      No r/g/ m   RESP  Clear  GI  Soft active bowel sounds non tender  EXT  No clubbing/Cyanosis /Left leg   NEURO  Alert oriented  No gross motor or sensory deficits      IMPRESSION:   Acute deep venous thrombosis in the left lower extremity: above and below the knee.  No evidence of deep venous thrombosis in the right lower extremity.    < from: TTE Echo Complete w/o Contrast w/ Doppler (06.04.20 @ 14:03) >  Technically difficult study  Normal left ventricular internal dimensions and systolic function, estimated LVEF of 65%.   The right ventricle is enlarged with with impaired systolic function that spares the right ventricularapex suggestive of Mitchell's sign  There is evidence of interventricular septal flattening consistent with right sided pressure overload   Right atrial enlargement  Normal trileaflet aortic valve, without AI.   Trace physiologic MR   Mild TR.    Nosignificant pericardial effusion.

## 2020-06-06 NOTE — PROGRESS NOTE ADULT - ASSESSMENT
70 y/o female with PMHx of Obesity, HTN, hyperlipidemia , who presents with syncope and found to have saddle PE and right main PE with lobar extension as well as evidence of RV strain on CT and echo  elevated biomarkers. PESI score of 1 (  at presentation) suggestive of increased risk. Patient had a fall one week ago and had swelling and ecchymosis of left leg  Patient agreed to be part of research and was randomized to peripheral TPA which she received.    Bnp 2033      S/P SYNCOPE DUE TO SADDLE PE  PROVOKED CLOT  left lower ext dvt  c/w heparin  slowly increase activity  Plan to change to noac  Stable for telemetry unit    DVT  c/w heparin, change to Noac    HYPERLIPIDEMIA   c/w Liptor

## 2020-06-07 LAB
ANION GAP SERPL CALC-SCNC: 14 MMOL/L — SIGNIFICANT CHANGE UP (ref 5–17)
APTT BLD: 31.9 SEC — SIGNIFICANT CHANGE UP (ref 27.5–36.3)
BUN SERPL-MCNC: 9 MG/DL — SIGNIFICANT CHANGE UP (ref 8–20)
CALCIUM SERPL-MCNC: 8.8 MG/DL — SIGNIFICANT CHANGE UP (ref 8.6–10.2)
CHLORIDE SERPL-SCNC: 104 MMOL/L — SIGNIFICANT CHANGE UP (ref 98–107)
CO2 SERPL-SCNC: 23 MMOL/L — SIGNIFICANT CHANGE UP (ref 22–29)
CREAT SERPL-MCNC: 0.47 MG/DL — LOW (ref 0.5–1.3)
GLUCOSE SERPL-MCNC: 88 MG/DL — SIGNIFICANT CHANGE UP (ref 70–99)
HCT VFR BLD CALC: 43.1 % — SIGNIFICANT CHANGE UP (ref 34.5–45)
HGB BLD-MCNC: 13.9 G/DL — SIGNIFICANT CHANGE UP (ref 11.5–15.5)
INR BLD: 1.38 RATIO — HIGH (ref 0.88–1.16)
MAGNESIUM SERPL-MCNC: 1.9 MG/DL — SIGNIFICANT CHANGE UP (ref 1.6–2.6)
MCHC RBC-ENTMCNC: 32.3 GM/DL — SIGNIFICANT CHANGE UP (ref 32–36)
MCHC RBC-ENTMCNC: 32.4 PG — SIGNIFICANT CHANGE UP (ref 27–34)
MCV RBC AUTO: 100.5 FL — HIGH (ref 80–100)
PHOSPHATE SERPL-MCNC: 4 MG/DL — SIGNIFICANT CHANGE UP (ref 2.4–4.7)
PLATELET # BLD AUTO: 147 K/UL — LOW (ref 150–400)
POTASSIUM SERPL-MCNC: 4 MMOL/L — SIGNIFICANT CHANGE UP (ref 3.5–5.3)
POTASSIUM SERPL-SCNC: 4 MMOL/L — SIGNIFICANT CHANGE UP (ref 3.5–5.3)
PROTHROM AB SERPL-ACNC: 15.7 SEC — HIGH (ref 10–12.9)
RBC # BLD: 4.29 M/UL — SIGNIFICANT CHANGE UP (ref 3.8–5.2)
RBC # FLD: 12.7 % — SIGNIFICANT CHANGE UP (ref 10.3–14.5)
SODIUM SERPL-SCNC: 141 MMOL/L — SIGNIFICANT CHANGE UP (ref 135–145)
WBC # BLD: 5.82 K/UL — SIGNIFICANT CHANGE UP (ref 3.8–10.5)
WBC # FLD AUTO: 5.82 K/UL — SIGNIFICANT CHANGE UP (ref 3.8–10.5)

## 2020-06-07 PROCEDURE — 99231 SBSQ HOSP IP/OBS SF/LOW 25: CPT

## 2020-06-07 PROCEDURE — 99233 SBSQ HOSP IP/OBS HIGH 50: CPT

## 2020-06-07 RX ADMIN — Medication 1 GRAM(S): at 12:07

## 2020-06-07 RX ADMIN — APIXABAN 10 MILLIGRAM(S): 2.5 TABLET, FILM COATED ORAL at 05:33

## 2020-06-07 RX ADMIN — AMLODIPINE BESYLATE 5 MILLIGRAM(S): 2.5 TABLET ORAL at 05:32

## 2020-06-07 RX ADMIN — PREGABALIN 1000 MICROGRAM(S): 225 CAPSULE ORAL at 12:07

## 2020-06-07 RX ADMIN — APIXABAN 10 MILLIGRAM(S): 2.5 TABLET, FILM COATED ORAL at 16:07

## 2020-06-07 RX ADMIN — ATORVASTATIN CALCIUM 20 MILLIGRAM(S): 80 TABLET, FILM COATED ORAL at 21:03

## 2020-06-07 RX ADMIN — DORZOLAMIDE HYDROCHLORIDE 1 DROP(S): 20 SOLUTION/ DROPS OPHTHALMIC at 05:33

## 2020-06-07 RX ADMIN — LATANOPROST 1 DROP(S): 0.05 SOLUTION/ DROPS OPHTHALMIC; TOPICAL at 21:03

## 2020-06-07 RX ADMIN — Medication 1 TABLET(S): at 12:06

## 2020-06-07 RX ADMIN — CHLORHEXIDINE GLUCONATE 1 APPLICATION(S): 213 SOLUTION TOPICAL at 05:33

## 2020-06-07 NOTE — PROGRESS NOTE ADULT - ASSESSMENT
Patient was admitted as a transfer from [facility] with Acute Submassive PE with RV Strain + LLE DVT. Seen by Cardiology and was offered PE study at [facility] (peripheral low dose systemic TPA vs ultrasound assisted catheter directed equivalent dose thrombolysis for treatment of submassive pulmonary embolism) and she agreed for that. She was randomized to peripheral TPA which she finished at 3 am, currently on Heparin Infusion.     #Acute Submassive PE with RV Strain  - Enrolled in PE study (randomized to peripheral TPA)  - transitioned now from heparin to eliquis  - Repeat TTE this afternoon  - Cardiology following    #LLE DVT  - eliquis as above  - denies pain in the left leg at this time    #right breast ecchymosis  - likely from AC  - denies having felt any mass there prior  - hold off on further imaging/US at this time    #Alcoholism - drinks wine daily - CIWA negative  - CIWA discontinued    #Thrombocytopenia  - Unknown baseline  - stable    #Elevated LFTs - mild  - repeat in the AM    #HTN  - Resume Amlodipine 5 mg daily    #HLD  - Continue Lipitor and Lovaza      #Glaucoma  - Continue Latanoprost and Dorzolamide eye drops    DVT Prophylaxis -- eliquis    Dispo: Home in 24 hours if bruising improving

## 2020-06-07 NOTE — PHYSICAL THERAPY INITIAL EVALUATION ADULT - FOLLOWS COMMANDS/ANSWERS QUESTIONS, REHAB EVAL
INDICATION: Altered mental status. Fall.



COMPARISON: None.



TECHNIQUE: Multidetector CT images foramen magnum to lung apices without contrast.

Multiplanar reformation.



REPORT: Negative for vertebral body or posterior element fracture at any level. Negative

for facet subluxation. Multilevel advanced degenerative spondylosis and less marked facet

joint osteoarthritis. Congenitally generous pedicles mitigate against acquired spinal

stenosis. Disc space narrowing is severe at C2-C3, C3-C4, C4-C5, and C5-C6. Associated

mild vertebral endplate osteophytosis and reactive endplate sclerosis most prominent at

C3-C4 and C4-C5. Unremarkable paravertebral soft tissue contours.



IMPRESSION: Negative for traumatic cervical spine injury. 100% of the time

## 2020-06-07 NOTE — PROGRESS NOTE ADULT - SUBJECTIVE AND OBJECTIVE BOX
CC: Acute submassive PE (07 Jun 2020 09:21)    INTERVAL HPI/OVERNIGHT EVENTS:  patient developed right breast ecchymosis which she reports is improving with ice packs  no other acute events  no other complaints today  ambulating well with PT without assistance down the barone.    Vital Signs Last 24 Hrs  T(C): 36.9 (07 Jun 2020 08:05), Max: 37.1 (06 Jun 2020 17:06)  T(F): 98.5 (07 Jun 2020 08:05), Max: 98.8 (06 Jun 2020 17:06)  HR: 93 (07 Jun 2020 08:05) (51 - 93)  BP: 138/69 (07 Jun 2020 08:05) (126/69 - 155/84)  BP(mean): 93 (06 Jun 2020 16:00) (92 - 100)  RR: 18 (07 Jun 2020 08:05) (17 - 22)  SpO2: 93% (07 Jun 2020 08:05) (91% - 97%)    PHYSICAL EXAM:  General: in no acute distress  Respiratory: No wheezes, rales or rhonchi  Cardiovascular: Regular rate and rhythm. S1 and S2 Normal; No murmurs, gallops or rubs  Gastrointestinal: Soft non-tender non-distended; Normal bowel sounds  Breast: right lateral, upper quadrant ecchymosis of the breast  Extremities: Normal range of motion, No clubbing, cyanosis or edema  Vascular: Peripheral pulses palpable 2+ bilaterally  Skin: Warm and dry. No acute rash  Psychiatric: Cooperative and appropriate    I&O's Detail    06 Jun 2020 07:01  -  07 Jun 2020 07:00  --------------------------------------------------------  IN:    Oral Fluid: 720 mL  Total IN: 720 mL    OUT:    Voided: 1650 mL  Total OUT: 1650 mL    Total NET: -930 mL      07 Jun 2020 07:01  -  07 Jun 2020 11:22  --------------------------------------------------------  IN:    Oral Fluid: 240 mL  Total IN: 240 mL    OUT:  Total OUT: 0 mL    Total NET: 240 mL    CARDIAC MARKERS ( 06 Jun 2020 02:28 )  x     / 0.04 ng/mL / x     / x     / x      CARDIAC MARKERS ( 05 Jun 2020 22:28 )  x     / 0.04 ng/mL / x     / x     / x      CARDIAC MARKERS ( 05 Jun 2020 18:49 )  x     / 0.04 ng/mL / x     / x     / x                            13.9   5.82  )-----------( 147      ( 07 Jun 2020 06:57 )             43.1     07 Jun 2020 06:57    141    |  104    |  9.0    ----------------------------<  88     4.0     |  23.0   |  0.47     Ca    8.8        07 Jun 2020 06:57  Phos  4.0       07 Jun 2020 06:57  Mg     1.9       07 Jun 2020 06:57    PT/INR - ( 07 Jun 2020 06:58 )   PT: 15.7 sec;   INR: 1.38 ratio      PTT - ( 07 Jun 2020 06:58 )  PTT:31.9 sec    MEDICATIONS  (STANDING):  amLODIPine   Tablet 5 milliGRAM(s) Oral daily  apixaban 10 milliGRAM(s) Oral every 12 hours  atorvastatin 20 milliGRAM(s) Oral at bedtime  chlorhexidine 2% Cloths 1 Application(s) Topical <User Schedule>  cyanocobalamin 1000 MICROGram(s) Oral daily  dorzolamide 2% Ophthalmic Solution 1 Drop(s) Both EYES <User Schedule>  latanoprost 0.005% Ophthalmic Solution 1 Drop(s) Both EYES at bedtime  multivitamin 1 Tablet(s) Oral daily  omega-3-Acid Ethyl Esters 1 Gram(s) Oral daily    MEDICATIONS  (PRN):  acetaminophen   Tablet .. 650 milliGRAM(s) Oral every 6 hours PRN Mild Pain (1 - 3)      RADIOLOGY & ADDITIONAL TESTS:

## 2020-06-07 NOTE — PHYSICAL THERAPY INITIAL EVALUATION ADULT - PERTINENT HX OF CURRENT PROBLEM, REHAB EVAL
Pt was admitted as a transfer from [facility] with Acute Submassive PE with RV Strain + LLE DVT.

## 2020-06-07 NOTE — CHART NOTE - NSCHARTNOTEFT_GEN_A_CORE
PA NOTE-MEDICINE    Called by RN to mihaela Pt c/o pain and bruising to Right breast noticed this AM.  Patient is a 69y old Female PMHx of HTN, HLD, and bowel resection transferred  from [facility] 6/6/20 after experiencing 2 episodes of syncope. PT dx w/ an acute submassive saddle PE w/ RV strain and LLE femoral, popliteal, posterior tibial, and peroneal DVT. Pt was then transferred to [facility] for EKOS evaluation. Pt was treated with peripheral TPA for PE 6/6/20 and is currently on Heparin Full A/C protocol along with Eliquis.  Pt states she felt pain to Right breast yesterday but did not notice any bruising.   Pt Denies: trauma, SOB, CP      T(C): 36.8 (07 Jun 2020 04:23), Max: 37.1 (06 Jun 2020 17:06)  T(F): 98.2 (07 Jun 2020 04:23), Max: 98.8 (06 Jun 2020 17:06)  HR: 68 (06 Jun 2020 17:06) (51 - 68)  BP: 142/82 (07 Jun 2020 05:00) (126/69 - 155/84)  BP(mean): 93 (06 Jun 2020 16:00) (92 - 109)  RR: 18 (06 Jun 2020 21:00) (17 - 39)  SpO2: 92% (06 Jun 2020 17:06) (91% - 97%)    General: WDWN Female sitting in chair NAD  Chest/Breasts:  + approx 5 x 8 cm painful, ecchymotic, indurated area to Lateral Right Breast No Nipple abnormalities    A/P Eval Pt 2/2 painful indurated ecchymotic area to Right Breast noticed this AM  Warm compresses to affected area R breast as directed  Will enter onto this AM signout list for MD/PA to follow up  ? sono  Continue to Monitor PT  Call PA If any changes in Pt status

## 2020-06-07 NOTE — PHYSICAL THERAPY INITIAL EVALUATION ADULT - REHAB POTENTIAL, PT EVAL
Pt is independent with mobility; no skilled inpatient PT services required at this time; PT will no longer continue to follow.

## 2020-06-07 NOTE — PROGRESS NOTE ADULT - ATTENDING COMMENTS
68 y/o female transferred from [place] due to submassive PE.   Enrolled in PE study at [facility] randomized to peripheral arm   improved SOB and O2   now on eliquis   repeat echo today ( limited echo done after randomization to peripheral TPA showed RA clot in transit)
70 y/o female with PMHx of HTN, hyperlipidemia  presented with saddle PE with right main thrombus   enrolled in PE study randomized to low dose peripheral TPA 24 mg /12 hours  SOB improved   SAT 96 % on RA   Echo tomorrow  Start eliquis 10 mg twice daily .stop heparin after 3 hours.
I saw this patient independently and agree with above.  For cardiology followup; switch to DOAC  OK for transfer to monitored bed.

## 2020-06-07 NOTE — PHYSICAL THERAPY INITIAL EVALUATION ADULT - GENERAL OBSERVATIONS, REHAB EVAL
Pt received in chair, + IV Loc, +Tele, breathing on RA in NAD, in 0/10 pain, agreeable to PT evaluation

## 2020-06-07 NOTE — PHYSICAL THERAPY INITIAL EVALUATION ADULT - ADDITIONAL COMMENTS
Pt lives in a private home with 3 steps to enter with 1 rail and no stairs inside.  Pt owns medical equipment: none  Pt lives with:  available to assist if needed  Pt drives and does not work.

## 2020-06-07 NOTE — PROGRESS NOTE ADULT - ASSESSMENT
70 y/o female with PMHx of Obesity, HTN, hyperlipidemia , who presents with syncope and found to have saddle PE and right main PE with lobar extension as well as evidence of RV strain on CT and echo  elevated biomarkers. PESI score of 1 (  at presentation) suggestive of increased risk. Patient had a fall one week ago and had swelling and ecchymosis of left leg  Patient agreed to be part of research and was randomized to peripheral TPA which she received.    Bnp 2033      S/P SYNCOPE DUE TO SADDLE PE  PROVOKED CLOT  left lower ext dvt  c/w eliquis  Await repeat echo    BREAST ECCHYMOSIS AND SMALL HEMATOMA  will apply ice compresses  self limiting    HYPERLIPIDEMIA   c/w LiptorHYPERLIPIDEMIA   c/w Lipitor    MODERATE ETOH USE with elevated mcv ( Drinks one bottle of wine per night)  Discussed the importance of decreasing her etoh intake  States she does not have a problem and will not have a issue with stopping

## 2020-06-07 NOTE — PROGRESS NOTE ADULT - SUBJECTIVE AND OBJECTIVE BOX
[facility]  [address], [place] [address]    REASON FOR VISIT:  Follow up on PE  UPDATE:  Sat 93% on ra  HR 64  patient is comfortable  Awaiting repeat echo  she did have some ecchymosis of right breast and no trauma.  small hematoma States she had a  mammogram within the year and it was ok  TELEMETRY MONITORING:  sinus rhythmn    CARDIAC MARKERS ( 05 Jun 2020 18:49 )  x     / 0.04 ng/mL / x     / x     / x          06-07    141  |  104  |  9.0  ----------------------------<  88  4.0   |  23.0  |  0.47<L>    Ca    8.8      07 Jun 2020 06:57  Phos  4.0     06-07  Mg     1.9     06-07      MEDICATIONS  (STANDING):  amLODIPine   Tablet 5 milliGRAM(s) Oral daily  apixaban 10 milliGRAM(s) Oral every 12 hours  atorvastatin 20 milliGRAM(s) Oral at bedtime  chlorhexidine 2% Cloths 1 Application(s) Topical <User Schedule>  cyanocobalamin 1000 MICROGram(s) Oral daily  dorzolamide 2% Ophthalmic Solution 1 Drop(s) Both EYES <User Schedule>  latanoprost 0.005% Ophthalmic Solution 1 Drop(s) Both EYES at bedtime  multivitamin 1 Tablet(s) Oral daily  omega-3-Acid Ethyl Esters 1 Gram(s) Oral daily    ROS:    No fever chills  Cardiac  No cp sob or palp  Resp  no cough no mucus production  Gi  no abd pain no melana  Ext No calf tenderness, no edema    Vital Signs Last 24 Hrs  T(C): 36.9 (07 Jun 2020 08:05), Max: 37.1 (06 Jun 2020 17:06)  T(F): 98.5 (07 Jun 2020 08:05), Max: 98.8 (06 Jun 2020 17:06)  HR: 93 (07 Jun 2020 08:05) (51 - 93)  BP: 138/69 (07 Jun 2020 08:05) (126/69 - 155/84)  BP(mean): 93 (06 Jun 2020 16:00) (92 - 109)  RR: 18 (07 Jun 2020 08:05) (17 - 27)  SpO2: 93% (07 Jun 2020 08:05) (91% - 97%)  T(C): 36.9 (06-07-20 @ 08:05), Max: 37.1 (06-06-20 @ 17:06)  HR: 93 (06-07-20 @ 08:05) (51 - 93)  BP: 138/69 (06-07-20 @ 08:05) (126/69 - 155/84)  RR: 18 (06-07-20 @ 08:05) (17 - 27)  SpO2: 93% (06-07-20 @ 08:05) (91% - 97%)    HEENT Head atraumatic eomi, oral mucosa moist  CV S1&S2   regular  Right brease wtih ecchymsois and small hematoma  RESP  clear   GI  Soft active bowel sounds non tender  EXT  No clubbing/Cyanosis /Edema  NEURO  Alert oriented  No gross motor or sensory deficits    < from: TTE Echo Complete w/o Contrast w/ Doppler (06.04.20 @ 14:03) >  Conclusion:   Technically difficult study  Normal left ventricular internal dimensions and systolic function, estimated LVEF of 65%.   The right ventricle is enlarged with with impaired systolic function that spares the right ventricularapex suggestive of Mitchell's sign  There is evidence of interventricular septal flattening consistent with right sided pressure overload   Right atrial enlargement  Normal trileaflet aortic valve, without AI.   Trace physiologic MR   Mild TR.    Nosignificant pericardial effusion.    < end of copied text >

## 2020-06-08 ENCOUNTER — TRANSCRIPTION ENCOUNTER (OUTPATIENT)
Age: 70
End: 2020-06-08

## 2020-06-08 VITALS
TEMPERATURE: 98 F | SYSTOLIC BLOOD PRESSURE: 118 MMHG | OXYGEN SATURATION: 91 % | DIASTOLIC BLOOD PRESSURE: 78 MMHG | RESPIRATION RATE: 19 BRPM | HEART RATE: 74 BPM

## 2020-06-08 LAB
ANION GAP SERPL CALC-SCNC: 14 MMOL/L — SIGNIFICANT CHANGE UP (ref 5–17)
BUN SERPL-MCNC: 10 MG/DL — SIGNIFICANT CHANGE UP (ref 8–20)
CALCIUM SERPL-MCNC: 8.8 MG/DL — SIGNIFICANT CHANGE UP (ref 8.6–10.2)
CHLORIDE SERPL-SCNC: 101 MMOL/L — SIGNIFICANT CHANGE UP (ref 98–107)
CO2 SERPL-SCNC: 24 MMOL/L — SIGNIFICANT CHANGE UP (ref 22–29)
CREAT SERPL-MCNC: 0.52 MG/DL — SIGNIFICANT CHANGE UP (ref 0.5–1.3)
FIBRINOGEN AG PPP IA-MCNC: 287 MG/DL — SIGNIFICANT CHANGE UP
FIBRINOGEN AG PPP IA-MCNC: 353 MG/DL — HIGH
GLUCOSE SERPL-MCNC: 96 MG/DL — SIGNIFICANT CHANGE UP (ref 70–99)
HCT VFR BLD CALC: 40.6 % — SIGNIFICANT CHANGE UP (ref 34.5–45)
HGB BLD-MCNC: 13.4 G/DL — SIGNIFICANT CHANGE UP (ref 11.5–15.5)
MCHC RBC-ENTMCNC: 32.8 PG — SIGNIFICANT CHANGE UP (ref 27–34)
MCHC RBC-ENTMCNC: 33 GM/DL — SIGNIFICANT CHANGE UP (ref 32–36)
MCV RBC AUTO: 99.5 FL — SIGNIFICANT CHANGE UP (ref 80–100)
NT-PROBNP SERPL-SCNC: 588 PG/ML — HIGH (ref 0–300)
PLATELET # BLD AUTO: 175 K/UL — SIGNIFICANT CHANGE UP (ref 150–400)
POTASSIUM SERPL-MCNC: 3.8 MMOL/L — SIGNIFICANT CHANGE UP (ref 3.5–5.3)
POTASSIUM SERPL-SCNC: 3.8 MMOL/L — SIGNIFICANT CHANGE UP (ref 3.5–5.3)
RBC # BLD: 4.08 M/UL — SIGNIFICANT CHANGE UP (ref 3.8–5.2)
RBC # FLD: 12.7 % — SIGNIFICANT CHANGE UP (ref 10.3–14.5)
SODIUM SERPL-SCNC: 139 MMOL/L — SIGNIFICANT CHANGE UP (ref 135–145)
WBC # BLD: 7.42 K/UL — SIGNIFICANT CHANGE UP (ref 3.8–10.5)
WBC # FLD AUTO: 7.42 K/UL — SIGNIFICANT CHANGE UP (ref 3.8–10.5)

## 2020-06-08 PROCEDURE — 99239 HOSP IP/OBS DSCHRG MGMT >30: CPT

## 2020-06-08 PROCEDURE — 80053 COMPREHEN METABOLIC PANEL: CPT

## 2020-06-08 PROCEDURE — 82962 GLUCOSE BLOOD TEST: CPT

## 2020-06-08 PROCEDURE — 83880 ASSAY OF NATRIURETIC PEPTIDE: CPT

## 2020-06-08 PROCEDURE — 80048 BASIC METABOLIC PNL TOTAL CA: CPT

## 2020-06-08 PROCEDURE — 99285 EMERGENCY DEPT VISIT HI MDM: CPT

## 2020-06-08 PROCEDURE — 85027 COMPLETE CBC AUTOMATED: CPT

## 2020-06-08 PROCEDURE — 85610 PROTHROMBIN TIME: CPT

## 2020-06-08 PROCEDURE — 84100 ASSAY OF PHOSPHORUS: CPT

## 2020-06-08 PROCEDURE — 36415 COLL VENOUS BLD VENIPUNCTURE: CPT

## 2020-06-08 PROCEDURE — 83735 ASSAY OF MAGNESIUM: CPT

## 2020-06-08 PROCEDURE — 86803 HEPATITIS C AB TEST: CPT

## 2020-06-08 PROCEDURE — 85385 FIBRINOGEN ANTIGEN: CPT

## 2020-06-08 PROCEDURE — 85730 THROMBOPLASTIN TIME PARTIAL: CPT

## 2020-06-08 PROCEDURE — 93308 TTE F-UP OR LMTD: CPT

## 2020-06-08 PROCEDURE — 93005 ELECTROCARDIOGRAM TRACING: CPT

## 2020-06-08 PROCEDURE — 84484 ASSAY OF TROPONIN QUANT: CPT

## 2020-06-08 PROCEDURE — 83605 ASSAY OF LACTIC ACID: CPT

## 2020-06-08 PROCEDURE — 97163 PT EVAL HIGH COMPLEX 45 MIN: CPT

## 2020-06-08 PROCEDURE — 93306 TTE W/DOPPLER COMPLETE: CPT

## 2020-06-08 PROCEDURE — 85384 FIBRINOGEN ACTIVITY: CPT

## 2020-06-08 RX ORDER — APIXABAN 2.5 MG/1
2 TABLET, FILM COATED ORAL
Qty: 65 | Refills: 0
Start: 2020-06-08 | End: 2020-07-07

## 2020-06-08 RX ADMIN — APIXABAN 10 MILLIGRAM(S): 2.5 TABLET, FILM COATED ORAL at 05:16

## 2020-06-08 RX ADMIN — Medication 1 TABLET(S): at 12:13

## 2020-06-08 RX ADMIN — DORZOLAMIDE HYDROCHLORIDE 1 DROP(S): 20 SOLUTION/ DROPS OPHTHALMIC at 05:16

## 2020-06-08 RX ADMIN — AMLODIPINE BESYLATE 5 MILLIGRAM(S): 2.5 TABLET ORAL at 05:16

## 2020-06-08 RX ADMIN — Medication 1 GRAM(S): at 12:12

## 2020-06-08 RX ADMIN — CHLORHEXIDINE GLUCONATE 1 APPLICATION(S): 213 SOLUTION TOPICAL at 05:16

## 2020-06-08 RX ADMIN — PREGABALIN 1000 MICROGRAM(S): 225 CAPSULE ORAL at 12:12

## 2020-06-08 NOTE — DISCHARGE NOTE PROVIDER - HOSPITAL COURSE
13.4     7.42  )-----------( 175      ( 08 Jun 2020 06:00 )               40.6     06-08        139  |  101  |  10.0    ----------------------------<  96    3.8   |  24.0  |  0.52        PT/INR - ( 07 Jun 2020 06:58 )   PT: 15.7 sec;   INR: 1.38 ratio      PTT - ( 07 Jun 2020 06:58 )  PTT:31.9 sec        Fibrinogen Assay: 274 mg/dl (06.06.20 @ 09:34)    Fibrinogen Antigen: 353: mg/dL (06.06.20 @ 02:48)        Serum Pro-Brain Natriuretic Peptide: 588 pg/mL (06.08.20 @ 06:00)        < from: TTE Echo Complete w/o Contrast w/ Doppler (06.07.20 @ 12:51) >    Summary:     1. Left ventricular ejection fraction, by visual estimation, is 65 to 70%.     2. Normal global left ventricular systolic function.     3. Spectral Doppler shows impaired relaxation pattern of left ventricular myocardial filling (Grade I diastolic dysfunction).     4. Moderately enlarged right ventricle.     5. Severely reduced RV systolic function.     6. Gonzáles's sign visualized.     7. Mild thickening of the anterior and posterior mitral valve leaflets.     8. Mild-moderate tricuspid regurgitation.    < end of copied text >        All electrolyte abnormalities were monitored carefully and repleted as necessary during this hospitalization. At the time of discharge patient was hemodynamically stable and amenable to all terms of discharge. The patient has received verbal instructions from myself regarding discharge plans.         Length of Discharge: 45MIN Patient is 69 F PMH HTN, HLD, and glaucoma presented to [facility] on 6/4 diagnosed with an acute submassive saddle PE with RV strain, as well as LLE DVT femoral, popliteal, posterior tibial, and peroneal v. BIBA to [place] following two episodes of witnessed syncopal episodes at home. Transferred to [facility] for EKOS treatment. Denies trauma to head during syncopal episodes. Denies chest pain, palpitations, dyspnea, cough, fever, chills, n/v/d, abd pain, dysuria, headaches, weakness, dizziness, confusion, changes in vision.         Patient seen by inpatient cardiology, agreed to enroll in the PE study at [facility] ( peripheral low dose systemic TPA vs ultrasound assisted catheter directed equivalent dose thrombolysis for treatment of submassive pulmonary embolism). Randomized to peripheral low dose systemic TPA, which she completed and was placed on heparin infusion. Transitioned to Eliquis for discharge. Repeat TTE showed positive Gonzláes's sign.         < from: TTE Echo Complete w/o Contrast w/ Doppler (06.07.20 @ 12:51) >    Summary:     1. Left ventricular ejection fraction, by visual estimation, is 65 to 70%.     2. Normal global left ventricular systolic function.     3. Spectral Doppler shows impaired relaxation pattern of left ventricular myocardial filling (Grade I diastolic dysfunction).     4. Moderately enlarged right ventricle.     5. Severely reduced RV systolic function.     6. Gonzáles's sign visualized.     7. Mild thickening of the anterior and posterior mitral valve leaflets.     8. Mild-moderate tricuspid regurgitation.    < end of copied text >                                        13.4     7.42  )-----------( 175      ( 08 Jun 2020 06:00 )               40.6     06-08        139  |  101  |  10.0    ----------------------------<  96    3.8   |  24.0  |  0.52        PT/INR - ( 07 Jun 2020 06:58 )   PT: 15.7 sec;   INR: 1.38 ratio      PTT - ( 07 Jun 2020 06:58 )  PTT:31.9 sec        Fibrinogen Assay: 274 mg/dl (06.06.20 @ 09:34)    Fibrinogen Antigen: 353: mg/dL (06.06.20 @ 02:48)        Serum Pro-Brain Natriuretic Peptide: 588 pg/mL (06.08.20 @ 06:00)            PHYSICAL EXAM:    General: adult female, in no acute distress    Respiratory: No wheezes, rales or rhonchi    Cardiovascular: Regular rate and rhythm. S1 and S2 Normal; No murmurs, gallops or rubs    Gastrointestinal: Soft non-tender non-distended; Normal bowel sounds    Breast: right lateral, upper quadrant ecchymosis of the breast, tender to palpation. ecchymosis on left lateral lower quadrant.    Extremities: Normal range of motion, No clubbing, cyanosis or edema    Vascular: Peripheral pulses palpable 2+ bilaterally    Skin: Warm and dry. No acute rash    Psychiatric: Cooperative and appropriate                #Acute Submassive PE with RV Strain    - Enrolled in PE study (randomized to peripheral TPA)    - transitioned from heparin to eliquis    - Repeat TTE results as above    - Pt to followup with cardiology as outpatient        #LLE DVT    - eliquis as above        #right breast ecchymosis likely from AC, no worsening on Eliquis, labs stable        #Alcoholism - drinks wine daily     - CIWA negative and discontinued        #Thrombocytopenia    - Unknown baseline    - stable        #Elevated LFTs - mild, downtrended during admission        #HTN    - Resumed home Amlodipine 5 mg daily        #HLD    - Continued Lipitor and Lovaza          #Glaucoma    - Continued Latanoprost and Dorzolamide eye drops                All electrolyte abnormalities were monitored carefully and repleted as necessary during this hospitalization. At the time of discharge patient was hemodynamically stable and amenable to all terms of discharge. The patient has received verbal instructions from myself regarding discharge plans.         Length of Discharge: 45MIN

## 2020-06-08 NOTE — PROGRESS NOTE ADULT - SUBJECTIVE AND OBJECTIVE BOX
[facility]                                                               Office: [address] [address]                                                              Telephone: [phone]. Fax:[phone]                                                                             PROGRESS NOTE  Reason for follow up: acute submassive PE  Overnight: No new events.   Update: Pt states feeling well, ecchymosis R breast same as yesterday. tender to touch, firm in upper right quadrant, approx size 3cm in diameter. denies chest pain, shortness of breath. "ready to go"       	  Vitals:  T(C): 36.9 (06-08-20 @ 07:54), Max: 37.3 (06-08-20 @ 03:52)  HR: 67 (06-08-20 @ 07:54) (62 - 78)  BP: 122/81 (06-08-20 @ 07:54) (113/76 - 145/76)  RR: 18 (06-08-20 @ 07:54) (18 - 18)  SpO2: 93% (06-08-20 @ 07:54) (90% - 93%)      I&O's Summary    07 Jun 2020 07:01  -  08 Jun 2020 07:00  --------------------------------------------------------  IN: 600 mL / OUT: 0 mL / NET: 600 mL      Weight (kg): 83.9 (06-04 @ 22:20), 79.2 (06-04 @ 12:34)      PHYSICAL EXAM:  Appearance: Comfortable. No acute distress  HEENT:  Head and neck: Atraumatic. Normocephalic.  Normal oral mucosa, PERRL, Neck is supple. No JVD, No carotid bruit.   Neurologic: A & O x 3, no focal deficits. EOMI.  Lymphatic: No cervical lymphadenopathy  Cardiovascular: Normal S1 S2, No murmur, rubs/gallops. No JVD, No edema  Respiratory: Lungs clear to auscultation  Gastrointestinal:  Soft, Non-tender, + BS  Lower Extremities: No edema  Psychiatry: Patient is calm. No agitation. Mood & affect appropriate  Skin: No rashes/ ecchymoses/cyanosis/ulcers visualized on the face, hands or feet.      CURRENT MEDICATIONS:  amLODIPine   Tablet 5 milliGRAM(s) Oral daily  atorvastatin  apixaban  chlorhexidine 2% Cloths  cyanocobalamin  dorzolamide 2% Ophthalmic Solution  latanoprost 0.005% Ophthalmic Solution  multivitamin      DIAGNOSTIC TESTING:  [ ] Echocardiogram:   < from: TTE Echo Complete w/o Contrast w/ Doppler (06.07.20 @ 12:51) >    Summary:   1. Left ventricular ejection fraction, by visual estimation, is 65 to 70%.   2. Normal global left ventricular systolic function.   3. Spectral Doppler shows impaired relaxation pattern of left ventricular myocardial filling (Grade I diastolic dysfunction).   4. Moderately enlarged right ventricle.   5. Severely reduced RV systolic function.   6. Gonzáles's sign visualized.   7. Mild thickening of the anterior and posterior mitral valve leaflets.   8. Mild-moderate tricuspid regurgitation.    MD Sultana Electronically signed on 6/7/2020 at 4:10:43 PM     < end of copied text >      LABS:	 	  CARDIAC MARKERS ( 08 Jun 2020 06:00 )  x     / x     / x     / x     / x      p-BNP 08 Jun 2020 06:00: 588 pg/mL, CARDIAC MARKERS ( 06 Jun 2020 09:36 )  x     / x     / x     / x     / x      p-BNP 06 Jun 2020 09:36: 1376 pg/mL, CARDIAC MARKERS ( 06 Jun 2020 02:29 )  x     / x     / x     / x     / x      p-BNP 06 Jun 2020 02:29: 1651 pg/mL, CARDIAC MARKERS ( 06 Jun 2020 02:28 )  x     / 0.04 ng/mL / x     / x     / x      p-BNP 06 Jun 2020 02:28: x    , CARDIAC MARKERS ( 05 Jun 2020 22:28 )  x     / 0.04 ng/mL / x     / x     / x      p-BNP 05 Jun 2020 22:28: 1602 pg/mL, CARDIAC MARKERS ( 05 Jun 2020 18:49 )  x     / 0.04 ng/mL / x     / x     / x      p-BNP 05 Jun 2020 18:49: 1534 pg/mL, CARDIAC MARKERS ( 05 Jun 2020 11:37 )  x     / x     / x     / x     / x      p-BNP 05 Jun 2020 11:37: 2033 pg/mL                          13.4   7.42  )-----------( 175      ( 08 Jun 2020 06:00 )             40.6     06-08  139  |  101  |  10.0  ----------------------------<  96  3.8   |  24.0  |  0.52      Ca    8.8      08 Jun 2020 06:00  Phos  4.0     06-07  Mg     1.9     06-07      proBNP: Serum Pro-Brain Natriuretic Peptide: 588 pg/mL (06-08 @ 06:00)  Serum Pro-Brain Natriuretic Peptide: 1376 pg/mL (06-06 @ 09:36)  Serum Pro-Brain Natriuretic Peptide: 1651 pg/mL (06-06 @ 02:29)  Serum Pro-Brain Natriuretic Peptide: 1602 pg/mL (06-05 @ 22:28)  Serum Pro-Brain Natriuretic Peptide: 1534 pg/mL (06-05 @ 18:49)  Serum Pro-Brain Natriuretic Peptide: 2033 pg/mL (06-05 @ 11:37)      TELEMETRY: Reviewed    ECG:  Reviewed by me.

## 2020-06-08 NOTE — DISCHARGE NOTE PROVIDER - PROVIDER TOKENS
PROVIDER:[TOKEN:[[id number]],FOLLOWUP:[1 month]],FREE:[LAST:[Primary Care],PHONE:[(   )    -],FAX:[(   )    -],FOLLOWUP:[2 weeks]]

## 2020-06-08 NOTE — DISCHARGE NOTE PROVIDER - CARE PROVIDER_API CALL
Wiliam Saenz  CARDIOVASCULAR DISEASE  [address] [id number]  Phone: [phone]  Fax: [phone]  Follow Up Time: 1 month    Primary Care,   Phone: (   )    -  Fax: (   )    -  Follow Up Time: 2 weeks

## 2020-06-08 NOTE — DISCHARGE NOTE PROVIDER - NSDCCPCAREPLAN_GEN_ALL_CORE_FT
PRINCIPAL DISCHARGE DIAGNOSIS  Diagnosis: Pulmonary embolism on right  Assessment and Plan of Treatment: Continue eliquis, 2 tablets twice a day through 6/12 then 1 tablet twice a day. Follow up with cardiology in 4 weeks. Return to ER for worsening shortness of breath, chest pain, increased cough or fever.      SECONDARY DISCHARGE DIAGNOSES  Diagnosis: Acute deep vein thrombosis (DVT)  Assessment and Plan of Treatment: Left lower extremity. Continue elquis as above. Return to ER for increased pain in left leg    Diagnosis: Ecchymosis  Assessment and Plan of Treatment: L breast. Monitor, return to ER if bruising gets worse.    Diagnosis: Alcohol abuse  Assessment and Plan of Treatment: Continue to refrain from alcohol    Diagnosis: HTN (hypertension)  Assessment and Plan of Treatment: Continue current home medications.    Diagnosis: HLD (hyperlipidemia)  Assessment and Plan of Treatment: Continue current home medications.

## 2020-06-08 NOTE — DISCHARGE NOTE PROVIDER - NSDCMRMEDTOKEN_GEN_ALL_CORE_FT
amLODIPine 5 mg oral tablet: 1 tab(s) orally once a day  apixaban 5 mg oral tablet: 2 tab(s) orally every 12 hours through 6/12 (5 days) then 1 tab twice a day  CoQ10: 100 milligram(s) orally once a day  Cosopt 2.23%-0.68% ophthalmic solution: 1 drop(s) to each affected eye 2 times a day  Fish Oil 1000 mg oral capsule: 1 cap(s) orally once a day  Lumigan 0.01% ophthalmic solution: 1 drop(s) to each affected eye once a day (in the evening)  Multiple Vitamins oral tablet: 1 tab(s) orally once a day  rosuvastatin 5 mg oral tablet: 1 tab(s) orally every other day  Vitamin B12 1000 mcg oral tablet: 1 tab(s) orally once a day  Vitamin D2 2000 intl units oral capsule: 1 cap(s) orally once a day

## 2020-06-08 NOTE — PROGRESS NOTE ADULT - REASON FOR ADMISSION
Acute submassive PE

## 2020-06-08 NOTE — DISCHARGE NOTE NURSING/CASE MANAGEMENT/SOCIAL WORK - PATIENT PORTAL LINK FT
You can access the FollowMyHealth Patient Portal offered by [facility] by registering at the following website: [email or web address]. By joining [employer]’s FollowMyHealth portal, you will also be able to view your health information using other applications (apps) compatible with our system.

## 2020-06-09 PROBLEM — Z00.00 ENCOUNTER FOR PREVENTIVE HEALTH EXAMINATION: Status: ACTIVE | Noted: 2020-06-09

## 2020-06-23 DIAGNOSIS — Z87.891 PERSONAL HISTORY OF NICOTINE DEPENDENCE: ICD-10-CM

## 2020-06-23 DIAGNOSIS — H40.9 UNSPECIFIED GLAUCOMA: ICD-10-CM

## 2020-06-23 RX ORDER — UBIDECARENONE 100 MG
100 CAPSULE ORAL DAILY
Refills: 0 | Status: ACTIVE | COMMUNITY
Start: 2020-06-23

## 2020-06-23 RX ORDER — AMLODIPINE BESYLATE 5 MG/1
5 TABLET ORAL DAILY
Qty: 90 | Refills: 1 | Status: ACTIVE | COMMUNITY
Start: 2020-06-23

## 2020-06-23 RX ORDER — BIMATOPROST 0.1 MG/ML
0.01 SOLUTION/ DROPS OPHTHALMIC DAILY
Refills: 0 | Status: ACTIVE | COMMUNITY
Start: 2020-06-23

## 2020-06-23 RX ORDER — OMEGA-3/DHA/EPA/FISH OIL 300-1000MG
1000 CAPSULE ORAL DAILY
Refills: 0 | Status: ACTIVE | COMMUNITY
Start: 2020-06-23

## 2020-06-23 RX ORDER — ELECTROLYTES/DEXTROSE
SOLUTION, ORAL ORAL DAILY
Qty: 90 | Refills: 1 | Status: ACTIVE | COMMUNITY
Start: 2020-06-23

## 2020-06-23 RX ORDER — CHOLECALCIFEROL (VITAMIN D3) 125 MCG
50 MCG TABLET ORAL DAILY
Refills: 0 | Status: ACTIVE | COMMUNITY
Start: 2020-06-23

## 2020-06-24 ENCOUNTER — APPOINTMENT (OUTPATIENT)
Dept: CARDIOLOGY | Facility: CLINIC | Age: 70
End: 2020-06-24
Payer: MEDICARE

## 2020-06-24 VITALS
WEIGHT: 171 LBS | OXYGEN SATURATION: 95 % | SYSTOLIC BLOOD PRESSURE: 128 MMHG | HEART RATE: 44 BPM | DIASTOLIC BLOOD PRESSURE: 75 MMHG | BODY MASS INDEX: 30.3 KG/M2 | TEMPERATURE: 98.1 F | HEIGHT: 63 IN

## 2020-06-24 VITALS — SYSTOLIC BLOOD PRESSURE: 124 MMHG | DIASTOLIC BLOOD PRESSURE: 67 MMHG

## 2020-06-24 PROCEDURE — 93000 ELECTROCARDIOGRAM COMPLETE: CPT

## 2020-06-24 PROCEDURE — 99214 OFFICE O/P EST MOD 30 MIN: CPT

## 2020-07-01 ENCOUNTER — APPOINTMENT (OUTPATIENT)
Dept: CARDIOLOGY | Facility: CLINIC | Age: 70
End: 2020-07-01

## 2020-07-06 NOTE — HISTORY OF PRESENT ILLNESS
[FreeTextEntry1] : Ms. Casanova is a 69 year old female who presents today for post hospital follow up s/p hospitalization at [facility] 1/4/20-1/8/20 for acute submassive PE.  \par \par History significant for HTN, HLD, and glaucoma who originally presented to [facility] on 6/4/20 diagnosed with an acute submassive saddle PE with RV strain, as well as LLE DVT femoral, popliteal, posterior tibial, and peronal. Presented to [facility] following two episodes of witnessed syncopal episodes at home. Transferred to [facility] for EKOS treatment. No trauma to head during syncopal episodes. Enrolled in the PE study at [facility] ( peripheral low dose systemic TPA vs. ultrasound assisted catheter directed equivalent dose thrombolysis for treatment of submassive pulmonary embolism).  Randomized to peripheral low dose systemic TPA, which she completed and was placed on heparin infusion. Transitioned to Eliquis for discharge. Repeat TTE showed positive Mitchell's sign, Moderately enlarged RV, severely reduced RV systolic function. \par \par Since discharge she reports she has felt well. Denies RIVERA, sob, lightheadedness, dizziness, syncope.  Left lower extremity edema persists, as well as right breast ecchymosis, resolving, with residual probable small hematoma.

## 2020-07-06 NOTE — PHYSICAL EXAM
[] : no respiratory distress [General Appearance - Well Nourished] : well nourished [General Appearance - Well Developed] : well developed [Heart Rate And Rhythm] : heart rate and rhythm were normal [Auscultation Breath Sounds / Voice Sounds] : lungs were clear to auscultation bilaterally [Respiration, Rhythm And Depth] : normal respiratory rhythm and effort [Heart Sounds] : normal S1 and S2 [Murmurs] : no murmurs present [Arterial Pulses Normal] : the arterial pulses were normal [Abnormal Walk] : normal gait [Oriented To Time, Place, And Person] : oriented to person, place, and time [FreeTextEntry1] : +2 LE edema

## 2020-07-06 NOTE — REVIEW OF SYSTEMS
[Lower Ext Edema] : lower extremity edema [Easy Bruising] : a tendency for easy bruising [Headache] : no headache [Feeling Fatigued] : not feeling fatigued [Shortness Of Breath] : no shortness of breath [Dyspnea on exertion] : not dyspnea during exertion [Chest  Pressure] : no chest pressure [Chest Pain] : no chest pain [Cough] : no cough [Palpitations] : no palpitations [Nausea] : no nausea [Dizziness] : no dizziness [Easy Bleeding] : no tendency for easy bleeding

## 2020-07-06 NOTE — DISCUSSION/SUMMARY
[FreeTextEntry1] : Ms. Casanova is a 69 year old female who presents today for post hospital follow up s/p hospitalization at [facility] 1/4/20-1/8/20 for acute submassive PE.  \par \par History significant for HTN, HLD, and glaucoma who originally presented to [facility] on 6/4/20 diagnosed with an acute submassive saddle PE with RV strain, as well as LLE DVT femoral, popliteal, posterior tibial, and peronal. Presented to [facility] following two episodes of witnessed syncopal episodes at home. Transferred to [facility] for EKOS treatment. No trauma to head during syncopal episodes. Enrolled in the PE study at [facility] ( peripheral low dose systemic TPA vs. ultrasound assisted catheter directed equivalent dose thrombolysis for treatment of submassive pulmonary embolism).  Randomized to peripheral low dose systemic TPA, which she completed and was placed on heparin infusion. Transitioned to Eliquis for discharge. Repeat TTE showed positive Mitchell's sign, Moderately enlarged RV, severely reduced RV systolic function. \par \par Since discharge she reports she has felt well. Denies RIVERA, sob, lightheadedness, dizziness, syncope.  Left lower extremity edema persists, as well as left breast ecchymosis, resolving, with residual probable small hematomas. \par \par Recommendation:\par \par 1. Saddle PE with RV strain s/p TPA, tolerating Eliquis- Repeat echocardiogram 30 days from TPA administration as scheduled\par 2.  Left lower extremity DVT (femoral, popliteal, posetrior tibial, and peroneal)- Repeat LLE venous doppler, to begin utilizing compression stocking 20-30 mmhg, if no improvement in edema to consider intervention. Dr. Saenz to f/u via phone \par 3.  Left breast ecchymosis, probable small hematoma- To utilize warm/cold compresses for symptomatic relief. Likely self limiting, she will report any increase in size, or if does not completely resolve within the next few weeks \par 4. Sinus bradycardia- without any symptoms of lightheadedness, dizziness, syncope in the setting of Acute PE only, to continue to monitor for any symptoms\par 5. RTO for follow up in 3 months\par \par Mahi Marin ANP-C \par

## 2020-07-08 ENCOUNTER — APPOINTMENT (OUTPATIENT)
Dept: CARDIOLOGY | Facility: CLINIC | Age: 70
End: 2020-07-08
Payer: MEDICARE

## 2020-07-08 PROCEDURE — 93356 MYOCRD STRAIN IMG SPCKL TRCK: CPT

## 2020-07-08 PROCEDURE — 93306 TTE W/DOPPLER COMPLETE: CPT

## 2020-07-09 ENCOUNTER — APPOINTMENT (OUTPATIENT)
Dept: CARDIOLOGY | Facility: CLINIC | Age: 70
End: 2020-07-09
Payer: MEDICARE

## 2020-07-09 PROCEDURE — 93970 EXTREMITY STUDY: CPT

## 2020-09-23 ENCOUNTER — APPOINTMENT (OUTPATIENT)
Dept: CARDIOLOGY | Facility: CLINIC | Age: 70
End: 2020-09-23
Payer: MEDICARE

## 2020-09-23 ENCOUNTER — NON-APPOINTMENT (OUTPATIENT)
Age: 70
End: 2020-09-23

## 2020-09-23 VITALS
TEMPERATURE: 98.4 F | SYSTOLIC BLOOD PRESSURE: 123 MMHG | BODY MASS INDEX: 30.82 KG/M2 | HEART RATE: 48 BPM | WEIGHT: 174 LBS | DIASTOLIC BLOOD PRESSURE: 74 MMHG | OXYGEN SATURATION: 98 %

## 2020-09-23 PROCEDURE — 99214 OFFICE O/P EST MOD 30 MIN: CPT

## 2020-09-23 PROCEDURE — 93000 ELECTROCARDIOGRAM COMPLETE: CPT

## 2020-09-23 NOTE — REVIEW OF SYSTEMS
[Lower Ext Edema] : lower extremity edema [Easy Bruising] : a tendency for easy bruising [Headache] : no headache [Feeling Fatigued] : not feeling fatigued [Shortness Of Breath] : no shortness of breath [Dyspnea on exertion] : not dyspnea during exertion [Chest  Pressure] : no chest pressure [Chest Pain] : no chest pain [Palpitations] : no palpitations [Cough] : no cough [Nausea] : no nausea [Dizziness] : no dizziness [Easy Bleeding] : no tendency for easy bleeding no

## 2020-09-23 NOTE — HISTORY OF PRESENT ILLNESS
[FreeTextEntry1] : Ms. Casanova is a 69 year old female who presents today for post hospital follow up s/p hospitalization at [facility] 1/4/20-1/8/20 for acute submassive PE.  \par \par History significant for HTN, HLD, and glaucoma who originally presented to [facility] on 6/4/20 diagnosed with an acute submassive saddle PE with RV strain, as well as LLE DVT femoral, popliteal, posterior tibial, and peronal. Presented to [facility] following two episodes of witnessed syncopal episodes at home. Transferred to [facility] for EKOS treatment. No trauma to head during syncopal episodes. Enrolled in the PE study at [facility] ( peripheral low dose systemic TPA vs. ultrasound assisted catheter directed equivalent dose thrombolysis for treatment of submassive pulmonary embolism).  Randomized to peripheral low dose systemic TPA, which she completed and was placed on heparin infusion. Transitioned to Eliquis for discharge. Repeat TTE showed positive Mitchell's sign, Moderately enlarged RV, severely reduced RV systolic function. \par \par 9/23/2020\par Feels great\par no exertional chest pain or  SOB, palpitations, dizziness or syncope\par Echo 30 days post reveals mildly enlarged RV and normal RV systolic function \par on eliquis\par wears compression stockings in LLE.

## 2020-09-23 NOTE — PHYSICAL EXAM
[General Appearance - Well Developed] : well developed [General Appearance - Well Nourished] : well nourished [] : no respiratory distress [Respiration, Rhythm And Depth] : normal respiratory rhythm and effort [Auscultation Breath Sounds / Voice Sounds] : lungs were clear to auscultation bilaterally [Heart Rate And Rhythm] : heart rate and rhythm were normal [Heart Sounds] : normal S1 and S2 [Murmurs] : no murmurs present [Arterial Pulses Normal] : the arterial pulses were normal [Abnormal Walk] : normal gait [Oriented To Time, Place, And Person] : oriented to person, place, and time [FreeTextEntry1] : wearing compression stokcings in LLE

## 2020-09-23 NOTE — ASSESSMENT
[FreeTextEntry1] : Ms. Casanova is a 69 year old female who presents today for post hospital follow up s/p hospitalization at [facility] 1/4/20-1/8/20 for acute submassive PE.  \par \par History significant for HTN, HLD, and glaucoma who originally presented to [facility] on 6/4/20 diagnosed with an acute submassive saddle PE with RV strain, as well as LLE DVT femoral, popliteal, posterior tibial, and peronal. Presented to [facility] following two episodes of witnessed syncopal episodes at home. Transferred to [facility] for EKOS treatment. No trauma to head during syncopal episodes. Enrolled in the PE study at [facility] ( peripheral low dose systemic TPA vs. ultrasound assisted catheter directed equivalent dose thrombolysis for treatment of submassive pulmonary embolism).  Randomized to peripheral low dose systemic TPA, which she completed and was placed on heparin infusion. Transitioned to Eliquis for discharge. Repeat TTE showed positive Mitchell's sign, Moderately enlarged RV, severely reduced RV systolic function. \par \par Since discharge she reports she has felt well. Denies RIVERA, sob, lightheadedness, dizziness, syncope.  Left lower extremity edema persists, as well as left breast ecchymosis, resolving, with residual probable small hematomas. \par \par Recommendation:\par \par 1. Saddle PE with RV strain s/p TPA, tolerating Eliquis- Repeat echocardiogram 30 days from TPA administration as scheduled. given provoked DVT/MT ( s/p fall and trauma to left leg ) will discontinue eliquis in 6 months from PE incidence ( 12/2020\par 2.  Left lower extremity DVT (femoral, popliteal, posetrior tibial, and peroneal)- Repeat LLE venous doppler, to begin utilizing compression stocking 20-30 mmhg, \par \par 4. Sinus bradycardia- without any symptoms of lightheadedness, dizziness, syncope in the setting of Acute PE only, to continue to monitor for any symptoms\par \par RTO for follow up in 3 months with NP\par \par \par

## 2020-10-08 ENCOUNTER — RX RENEWAL (OUTPATIENT)
Age: 70
End: 2020-10-08

## 2020-12-23 ENCOUNTER — APPOINTMENT (OUTPATIENT)
Dept: CARDIOLOGY | Facility: CLINIC | Age: 70
End: 2020-12-23
Payer: MEDICARE

## 2020-12-23 VITALS
HEART RATE: 51 BPM | SYSTOLIC BLOOD PRESSURE: 138 MMHG | OXYGEN SATURATION: 98 % | TEMPERATURE: 97 F | WEIGHT: 172 LBS | BODY MASS INDEX: 29.37 KG/M2 | HEIGHT: 64 IN | DIASTOLIC BLOOD PRESSURE: 69 MMHG

## 2020-12-23 PROCEDURE — 99214 OFFICE O/P EST MOD 30 MIN: CPT

## 2020-12-23 NOTE — ASSESSMENT
[FreeTextEntry1] : Ms. Casanova is a 69 year old female who presents today for post hospital follow up s/p hospitalization at [facility] 1/4/20-1/8/20 for acute submassive PE.  \par \par History significant for HTN, HLD, and glaucoma who originally presented to [facility] on 6/4/20 diagnosed with an acute submassive saddle PE with RV strain, as well as LLE DVT femoral, popliteal, posterior tibial, and peronal. Presented to [facility] following two episodes of witnessed syncopal episodes at home. Transferred to [facility] for EKOS treatment. No trauma to head during syncopal episodes. Enrolled in the PE study at [facility] ( peripheral low dose systemic TPA vs. ultrasound assisted catheter directed equivalent dose thrombolysis for treatment of submassive pulmonary embolism).  Randomized to peripheral low dose systemic TPA, which she completed and was placed on heparin infusion. Transitioned to Eliquis for discharge.\par \par Recommendation:\par \par 1. Saddle PE with RV strain s/p TPA, tolerating Eliquis- echocardiogram 30 days from TPA administration as scheduled showed mild RV dilatation and normal RV function . likely provoked DVT  ( s/p fall and trauma to left leg ) . Patient has however  a family history of PE .will repeat U/S venous , as well as CT for  PE, refer to hematology for workup of hypercoagulable state ( though she likely has a provovked DVt, she has family history of PE. \par 2.  Left lower extremity DVT (femoral, popliteal, posetrior tibial, and peroneal)- Repeat LLE venous doppler due to residual left SFA and popliteal DVT 1 month post PE.   continue utilizing compression stocking 20-30 mmhg, \par \par RTO for follow up in 3 months\par \par

## 2020-12-23 NOTE — HISTORY OF PRESENT ILLNESS
[FreeTextEntry1] : Ms. Casanova is a 69 year old female who presents today for post hospital follow up s/p hospitalization at [facility] 1/4/20-1/8/20 for acute submassive PE.  \par \par History significant for HTN, HLD, and glaucoma who originally presented to [facility] on 6/4/20 diagnosed with an acute submassive saddle PE with RV strain, as well as LLE DVT femoral, popliteal, posterior tibial, and peronal. Presented to [facility] following two episodes of witnessed syncopal episodes at home. Transferred to [facility] for EKOS treatment. No trauma to head during syncopal episodes. Enrolled in the PE study at [facility] ( peripheral low dose systemic TPA vs. ultrasound assisted catheter directed equivalent dose thrombolysis for treatment of submassive pulmonary embolism).  Randomized to peripheral low dose systemic TPA, which she completed and was placed on heparin infusion. Transitioned to Eliquis for discharge. Repeat TTE showed positive Mitchell's sign, Moderately enlarged RV, severely reduced RV systolic function. \par \par 9/23/2020\par Feels great\par no exertional chest pain or  SOB, palpitations, dizziness or syncope\par Echo 30 days post reveals mildly enlarged RV and normal RV systolic function \par on eliquis\par wears compression stockings in LLE.\par \par 12/23/2020\par no exertional chest pain or  SOB, palpitations, dizziness or syncope\par still on anticoagulation \par \par

## 2020-12-23 NOTE — REVIEW OF SYSTEMS
[Lower Ext Edema] : lower extremity edema [Easy Bruising] : a tendency for easy bruising [Headache] : no headache [Feeling Fatigued] : not feeling fatigued [Shortness Of Breath] : no shortness of breath [Dyspnea on exertion] : not dyspnea during exertion [Chest  Pressure] : no chest pressure [Chest Pain] : no chest pain [Palpitations] : no palpitations [Cough] : no cough [Nausea] : no nausea [Dizziness] : no dizziness [Easy Bleeding] : no tendency for easy bleeding

## 2020-12-28 ENCOUNTER — APPOINTMENT (OUTPATIENT)
Dept: CARDIOLOGY | Facility: CLINIC | Age: 70
End: 2020-12-28
Payer: MEDICARE

## 2020-12-28 PROCEDURE — 93970 EXTREMITY STUDY: CPT

## 2020-12-29 ENCOUNTER — APPOINTMENT (OUTPATIENT)
Dept: CT IMAGING | Facility: CLINIC | Age: 70
End: 2020-12-29
Payer: MEDICARE

## 2020-12-29 ENCOUNTER — RESULT REVIEW (OUTPATIENT)
Age: 70
End: 2020-12-29

## 2020-12-29 ENCOUNTER — OUTPATIENT (OUTPATIENT)
Dept: OUTPATIENT SERVICES | Facility: HOSPITAL | Age: 70
LOS: 1 days | End: 2020-12-29
Payer: MEDICARE

## 2020-12-29 DIAGNOSIS — I82.409 ACUTE EMBOLISM AND THROMBOSIS OF UNSPECIFIED DEEP VEINS OF UNSPECIFIED LOWER EXTREMITY: ICD-10-CM

## 2020-12-29 DIAGNOSIS — Z90.49 ACQUIRED ABSENCE OF OTHER SPECIFIED PARTS OF DIGESTIVE TRACT: Chronic | ICD-10-CM

## 2020-12-29 DIAGNOSIS — I26.99 OTHER PULMONARY EMBOLISM WITHOUT ACUTE COR PULMONALE: ICD-10-CM

## 2020-12-29 PROCEDURE — 71275 CT ANGIOGRAPHY CHEST: CPT

## 2020-12-29 PROCEDURE — 71275 CT ANGIOGRAPHY CHEST: CPT | Mod: 26

## 2021-01-22 ENCOUNTER — OUTPATIENT (OUTPATIENT)
Dept: OUTPATIENT SERVICES | Facility: HOSPITAL | Age: 71
LOS: 1 days | Discharge: HOME | End: 2021-01-22

## 2021-01-22 DIAGNOSIS — Z90.49 ACQUIRED ABSENCE OF OTHER SPECIFIED PARTS OF DIGESTIVE TRACT: Chronic | ICD-10-CM

## 2021-01-23 ENCOUNTER — OUTPATIENT (OUTPATIENT)
Dept: OUTPATIENT SERVICES | Facility: HOSPITAL | Age: 71
LOS: 1 days | Discharge: HOME | End: 2021-01-23

## 2021-01-23 DIAGNOSIS — I26.99 OTHER PULMONARY EMBOLISM WITHOUT ACUTE COR PULMONALE: ICD-10-CM

## 2021-01-23 DIAGNOSIS — Z90.49 ACQUIRED ABSENCE OF OTHER SPECIFIED PARTS OF DIGESTIVE TRACT: Chronic | ICD-10-CM

## 2021-01-27 ENCOUNTER — OUTPATIENT (OUTPATIENT)
Dept: OUTPATIENT SERVICES | Facility: HOSPITAL | Age: 71
LOS: 1 days | End: 2021-01-27

## 2021-01-27 ENCOUNTER — RESULT REVIEW (OUTPATIENT)
Age: 71
End: 2021-01-27

## 2021-01-27 ENCOUNTER — APPOINTMENT (OUTPATIENT)
Dept: HEMATOLOGY ONCOLOGY | Facility: CLINIC | Age: 71
End: 2021-01-27
Payer: MEDICARE

## 2021-01-27 VITALS
WEIGHT: 178 LBS | SYSTOLIC BLOOD PRESSURE: 129 MMHG | DIASTOLIC BLOOD PRESSURE: 78 MMHG | BODY MASS INDEX: 30.39 KG/M2 | OXYGEN SATURATION: 97 % | HEIGHT: 64 IN | HEART RATE: 57 BPM

## 2021-01-27 DIAGNOSIS — Z90.49 ACQUIRED ABSENCE OF OTHER SPECIFIED PARTS OF DIGESTIVE TRACT: Chronic | ICD-10-CM

## 2021-01-27 DIAGNOSIS — I26.99 OTHER PULMONARY EMBOLISM WITHOUT ACUTE COR PULMONALE: ICD-10-CM

## 2021-01-27 DIAGNOSIS — Z82.49 FAMILY HISTORY OF ISCHEMIC HEART DISEASE AND OTHER DISEASES OF THE CIRCULATORY SYSTEM: ICD-10-CM

## 2021-01-27 LAB
BASOPHILS # BLD AUTO: 0.1 K/UL — SIGNIFICANT CHANGE UP (ref 0–0.2)
BASOPHILS NFR BLD AUTO: 1.1 % — SIGNIFICANT CHANGE UP (ref 0–2)
EOSINOPHIL # BLD AUTO: 0.1 K/UL — SIGNIFICANT CHANGE UP (ref 0–0.5)
EOSINOPHIL NFR BLD AUTO: 1.4 % — SIGNIFICANT CHANGE UP (ref 0–6)
HCT VFR BLD CALC: 45.6 % — HIGH (ref 34.5–45)
HGB BLD-MCNC: 14.4 G/DL — SIGNIFICANT CHANGE UP (ref 11.5–15.5)
LYMPHOCYTES # BLD AUTO: 1.7 K/UL — SIGNIFICANT CHANGE UP (ref 1–3.3)
LYMPHOCYTES # BLD AUTO: 30.8 % — SIGNIFICANT CHANGE UP (ref 13–44)
MCHC RBC-ENTMCNC: 31.5 PG — SIGNIFICANT CHANGE UP (ref 27–34)
MCHC RBC-ENTMCNC: 31.6 G/DL — LOW (ref 32–36)
MCV RBC AUTO: 99.9 FL — SIGNIFICANT CHANGE UP (ref 80–100)
MONOCYTES # BLD AUTO: 0.4 K/UL — SIGNIFICANT CHANGE UP (ref 0–0.9)
MONOCYTES NFR BLD AUTO: 7.2 % — SIGNIFICANT CHANGE UP (ref 2–14)
NEUTROPHILS # BLD AUTO: 3.2 K/UL — SIGNIFICANT CHANGE UP (ref 1.8–7.4)
NEUTROPHILS NFR BLD AUTO: 59.5 % — SIGNIFICANT CHANGE UP (ref 43–77)
PLATELET # BLD AUTO: 194 K/UL — SIGNIFICANT CHANGE UP (ref 150–400)
RBC # BLD: 4.56 M/UL — SIGNIFICANT CHANGE UP (ref 3.8–5.2)
RBC # FLD: 12 % — SIGNIFICANT CHANGE UP (ref 10.3–14.5)
WBC # BLD: 5.4 K/UL — SIGNIFICANT CHANGE UP (ref 3.8–10.5)
WBC # FLD AUTO: 5.4 K/UL — SIGNIFICANT CHANGE UP (ref 3.8–10.5)

## 2021-01-27 PROCEDURE — 99203 OFFICE O/P NEW LOW 30 MIN: CPT

## 2021-01-27 NOTE — ASSESSMENT
[FreeTextEntry1] : GIven chronic non occlusive VTE, can consider prolonged half dose eliquis for 6 months even if hypercoagulable w/u negative\par She is following up with cardiology for AC \par F/u in 1 month

## 2021-01-27 NOTE — HISTORY OF PRESENT ILLNESS
[de-identified] : 70  year old female who was referred to our office due to a submassive PE in June 2020\par Patient had a s/p hospitalization at [facility] 6/4/20-6/8/20 for acute submassive PE. \par \par History significant for HTN, HLD, and glaucoma who originally presented to [facility] on 6/4/20 diagnosed with an acute submassive saddle PE with RV strain, as well as LLE DVT femoral, popliteal, posterior tibial, and peronal. \par Presented to [facility] following two episodes of witnessed syncopal episodes at home. Patient with a Provoked DVT s/p Fall and trauma to left leg \par \par Transferred to [facility] for EKOS treatment. No trauma to head during syncopal episodes. \par Enrolled in the PE study at [facility] ( peripheral low dose systemic TPA vs. ultrasound assisted catheter directed equivalent dose thrombolysis for treatment of submassive pulmonary embolism). Randomized to peripheral low dose systemic TPA, which she completed and was placed on heparin infusion.\par Transitioned to [facility] for discharge. \par \par  [de-identified] : Patient here for initial visit She is on Eliquis BID  \par \par CT Angio in Dec 2020, with NO evidence of PE, Small 1.2 cm indeterminate lesion in pancreas\par \par Doppler LE with chronic non occlusive thrombus to Left LE, no DVT in rt LE\par \par Family history Significant with MOther with DVT? Heart attack, sister with PE post brain surgery

## 2021-02-19 ENCOUNTER — APPOINTMENT (OUTPATIENT)
Dept: MRI IMAGING | Facility: CLINIC | Age: 71
End: 2021-02-19
Payer: MEDICARE

## 2021-02-19 ENCOUNTER — RESULT REVIEW (OUTPATIENT)
Age: 71
End: 2021-02-19

## 2021-02-19 ENCOUNTER — OUTPATIENT (OUTPATIENT)
Dept: OUTPATIENT SERVICES | Facility: HOSPITAL | Age: 71
LOS: 1 days | End: 2021-02-19
Payer: MEDICARE

## 2021-02-19 DIAGNOSIS — K86.9 DISEASE OF PANCREAS, UNSPECIFIED: ICD-10-CM

## 2021-02-19 DIAGNOSIS — Z90.49 ACQUIRED ABSENCE OF OTHER SPECIFIED PARTS OF DIGESTIVE TRACT: Chronic | ICD-10-CM

## 2021-02-19 PROCEDURE — 74183 MRI ABD W/O CNTR FLWD CNTR: CPT | Mod: 26,ME

## 2021-02-19 PROCEDURE — 72197 MRI PELVIS W/O & W/DYE: CPT

## 2021-02-19 PROCEDURE — 72197 MRI PELVIS W/O & W/DYE: CPT | Mod: 26,ME

## 2021-02-19 PROCEDURE — 74183 MRI ABD W/O CNTR FLWD CNTR: CPT

## 2021-02-19 PROCEDURE — G1004: CPT

## 2021-03-07 ENCOUNTER — OUTPATIENT (OUTPATIENT)
Dept: OUTPATIENT SERVICES | Facility: HOSPITAL | Age: 71
LOS: 1 days | Discharge: HOME | End: 2021-03-07

## 2021-03-07 DIAGNOSIS — Z90.49 ACQUIRED ABSENCE OF OTHER SPECIFIED PARTS OF DIGESTIVE TRACT: Chronic | ICD-10-CM

## 2021-03-07 DIAGNOSIS — I26.99 OTHER PULMONARY EMBOLISM WITHOUT ACUTE COR PULMONALE: ICD-10-CM

## 2021-03-08 ENCOUNTER — APPOINTMENT (OUTPATIENT)
Dept: HEMATOLOGY ONCOLOGY | Facility: CLINIC | Age: 71
End: 2021-03-08
Payer: MEDICARE

## 2021-03-08 ENCOUNTER — RESULT REVIEW (OUTPATIENT)
Age: 71
End: 2021-03-08

## 2021-03-08 VITALS
SYSTOLIC BLOOD PRESSURE: 129 MMHG | WEIGHT: 177.01 LBS | HEART RATE: 50 BPM | HEIGHT: 64 IN | DIASTOLIC BLOOD PRESSURE: 83 MMHG | OXYGEN SATURATION: 95 % | BODY MASS INDEX: 30.22 KG/M2

## 2021-03-08 LAB
AT III PPP CHRO-ACNC: 146 %
B2 GLYCOPROT1 IGA SERPL IA-ACNC: <5 SAU
B2 GLYCOPROT1 IGG SER-ACNC: <5 SGU
B2 GLYCOPROT1 IGM SER-ACNC: <5 SMU
BASOPHILS # BLD AUTO: 0 K/UL — SIGNIFICANT CHANGE UP (ref 0–0.2)
BASOPHILS NFR BLD AUTO: 0.7 % — SIGNIFICANT CHANGE UP (ref 0–2)
CARDIOLIPIN IGM SER-MCNC: 15.1 MPL
CARDIOLIPIN IGM SER-MCNC: <5 GPL
DNA PLOIDY SPEC FC-IMP: NORMAL
EOSINOPHIL # BLD AUTO: 0.1 K/UL — SIGNIFICANT CHANGE UP (ref 0–0.5)
EOSINOPHIL NFR BLD AUTO: 2.9 % — SIGNIFICANT CHANGE UP (ref 0–6)
HCT VFR BLD CALC: 45.5 % — HIGH (ref 34.5–45)
HGB BLD-MCNC: 14.4 G/DL — SIGNIFICANT CHANGE UP (ref 11.5–15.5)
LYMPHOCYTES # BLD AUTO: 1.4 K/UL — SIGNIFICANT CHANGE UP (ref 1–3.3)
LYMPHOCYTES # BLD AUTO: 29.6 % — SIGNIFICANT CHANGE UP (ref 13–44)
MCHC RBC-ENTMCNC: 31.6 G/DL — LOW (ref 32–36)
MCHC RBC-ENTMCNC: 31.7 PG — SIGNIFICANT CHANGE UP (ref 27–34)
MCV RBC AUTO: 100.1 FL — HIGH (ref 80–100)
MONOCYTES # BLD AUTO: 0.2 K/UL — SIGNIFICANT CHANGE UP (ref 0–0.9)
MONOCYTES NFR BLD AUTO: 5.1 % — SIGNIFICANT CHANGE UP (ref 2–14)
NEUTROPHILS # BLD AUTO: 3 K/UL — SIGNIFICANT CHANGE UP (ref 1.8–7.4)
NEUTROPHILS NFR BLD AUTO: 61.7 % — SIGNIFICANT CHANGE UP (ref 43–77)
PLATELET # BLD AUTO: 214 K/UL — SIGNIFICANT CHANGE UP (ref 150–400)
PROT C PPP CHRO-ACNC: 142 %
PROT S FREE PPP-ACNC: 41 %
PTR INTERP: NORMAL
RBC # BLD: 4.55 M/UL — SIGNIFICANT CHANGE UP (ref 3.8–5.2)
RBC # FLD: 12.2 % — SIGNIFICANT CHANGE UP (ref 10.3–14.5)
WBC # BLD: 4.8 K/UL — SIGNIFICANT CHANGE UP (ref 3.8–10.5)
WBC # FLD AUTO: 4.8 K/UL — SIGNIFICANT CHANGE UP (ref 3.8–10.5)

## 2021-03-08 PROCEDURE — 99215 OFFICE O/P EST HI 40 MIN: CPT

## 2021-03-08 RX ORDER — CEFADROXIL 500 MG/1
500 CAPSULE ORAL
Qty: 14 | Refills: 0 | Status: DISCONTINUED | COMMUNITY
Start: 2021-03-02

## 2021-03-08 NOTE — ASSESSMENT
[FreeTextEntry1] : Patient with an acute submassive saddle PE with RV strain, as well as LLE DVT femoral, popliteal, posterior tibial, and peronal. in June 2020 s/p EKOS at [facility] \par . Patient with a Provoked DVT s/p Fall and trauma to left leg in may 2020\par  \par Subsequent CT angio with no evidence of PE. Doppler LE with Chronic NOn occlusive clot in left LE\par FAMILY HISTORYMother with DVT with Heart attack? Sister with PE post brain surgery \par \par PE/ DVT \par PANCREATIC LESION \par \par - Hypercoaguble w/u with Indeterminate Cardiolipin IgM 15.1, Protein S slightly low at 41% \par Protein S level can sometimes be low - however Patient does not appear to have any conditions that can cause a low protein S def like OCPs DIC Acute thrombosis  Liver disease \par - WILL REPEAT PROTIN S LEVEL today in addition will repeat Cardiolipin Ab. Patient was drinking a bottle of wine a day when LABS Were done on 1/27/21. Since then she has cut down to 1.2 bottle wine\par - discussed Increased risk of warfarin induced skin necrosis with Protein S deficiency, Avoid use/ Caution with use\par - Given that no strong Family h/o VTE, IN order to definitively diagnose a Protein S deficiency which is causing an increased risk of VTE, PRotein S level should usually be below 30 Howevr given chronicity of DVT will continue AC \par - MRI ABDOMEN: With mild dilalation of the main pancreatic duct in eth head and neck meauring 6 mm without transion or enhancement \par Question of Main duct IPMN / papillary mucinous Neoplasm Refer to GI\par \par \par \par

## 2021-03-08 NOTE — HISTORY OF PRESENT ILLNESS
[de-identified] : 70  year old female who was referred to our office due to a submassive PE in June 2020\par Patient had a s/p hospitalization at [facility] 6/4/20-6/8/20 for acute submassive PE. \par \par History significant for HTN, HLD, and glaucoma who originally presented to [facility] on 6/4/20 diagnosed with an acute submassive saddle PE with RV strain, as well as LLE DVT femoral, popliteal, posterior tibial, and peronal. \par Presented to [facility] following two episodes of witnessed syncopal episodes at home. Patient with a Provoked DVT s/p Fall and trauma to left leg \par \par Transferred to [facility] for EKOS treatment. No trauma to head during syncopal episodes. \par Enrolled in the PE study at [facility] ( peripheral low dose systemic TPA vs. ultrasound assisted catheter directed equivalent dose thrombolysis for treatment of submassive pulmonary embolism). Randomized to peripheral low dose systemic TPA, which she completed and was placed on heparin infusion.\par Transitioned to [facility] for discharge. \par \par  [de-identified] : Patient here for follow up She is on Eliquis BID  No bleeding Issues\par \par CT Angio in Dec 2020, with NO evidence of PE, Small 1.2 cm indeterminate lesion in pancreas\par Doppler LE with chronic non occlusive thrombus to Left LE, no DVT in rt LE\par \par Hypercoaguble W/u done on 1/27/21: \par AT III WNL, \par Cardiolipin IgG < 5 Cardiolipin IgM 15.1 ( indeterminate) \par B2 Glycoprotein IgA< 5 , B2 Glycoprotein IgG < 5 , B2 Glycoprotein IgM < 5 \par PRotein S 41% Protein C142% WNL \par PRothrombin gene Mutation NORMAL \par Factor V leiden NORMAL\par \par Patient was drinking a bottle of wine a day when LABS Were done on 1/27/21. Since then she has cut down to 1.2 bottle wine

## 2021-03-09 ENCOUNTER — NON-APPOINTMENT (OUTPATIENT)
Age: 71
End: 2021-03-09

## 2021-03-11 ENCOUNTER — NON-APPOINTMENT (OUTPATIENT)
Age: 71
End: 2021-03-11

## 2021-03-11 LAB
ALBUMIN SERPL ELPH-MCNC: 4.3 G/DL
ALP BLD-CCNC: 85 U/L
ALT SERPL-CCNC: 24 U/L
ANION GAP SERPL CALC-SCNC: 9 MMOL/L
AST SERPL-CCNC: 24 U/L
BILIRUB SERPL-MCNC: 0.7 MG/DL
BUN SERPL-MCNC: 18 MG/DL
CALCIUM SERPL-MCNC: 9.4 MG/DL
CARDIOLIPIN IGM SER-MCNC: 14 MPL
CARDIOLIPIN IGM SER-MCNC: <5 GPL
CHLORIDE SERPL-SCNC: 103 MMOL/L
CO2 SERPL-SCNC: 29 MMOL/L
CREAT SERPL-MCNC: 0.78 MG/DL
GLUCOSE SERPL-MCNC: 101 MG/DL
POTASSIUM SERPL-SCNC: 4.2 MMOL/L
PROT S FREE PPP-ACNC: 31 %
PROT SERPL-MCNC: 6.9 G/DL
SODIUM SERPL-SCNC: 141 MMOL/L

## 2021-03-17 ENCOUNTER — APPOINTMENT (OUTPATIENT)
Dept: GASTROENTEROLOGY | Facility: CLINIC | Age: 71
End: 2021-03-17
Payer: MEDICARE

## 2021-03-17 VITALS
BODY MASS INDEX: 29.71 KG/M2 | HEIGHT: 64 IN | SYSTOLIC BLOOD PRESSURE: 130 MMHG | HEART RATE: 74 BPM | TEMPERATURE: 97 F | WEIGHT: 174 LBS | DIASTOLIC BLOOD PRESSURE: 78 MMHG

## 2021-03-17 DIAGNOSIS — Z80.3 FAMILY HISTORY OF MALIGNANT NEOPLASM OF BREAST: ICD-10-CM

## 2021-03-17 DIAGNOSIS — Z80.1 FAMILY HISTORY OF MALIGNANT NEOPLASM OF TRACHEA, BRONCHUS AND LUNG: ICD-10-CM

## 2021-03-17 DIAGNOSIS — Z86.39 PERSONAL HISTORY OF OTHER ENDOCRINE, NUTRITIONAL AND METABOLIC DISEASE: ICD-10-CM

## 2021-03-17 DIAGNOSIS — Z83.79 FAMILY HISTORY OF OTHER DISEASES OF THE DIGESTIVE SYSTEM: ICD-10-CM

## 2021-03-17 DIAGNOSIS — R93.5 ABNORMAL FINDINGS ON DIAGNOSTIC IMAGING OF OTHER ABDOMINAL REGIONS, INCLUDING RETROPERITONEUM: ICD-10-CM

## 2021-03-17 DIAGNOSIS — Z78.9 OTHER SPECIFIED HEALTH STATUS: ICD-10-CM

## 2021-03-17 DIAGNOSIS — Z80.42 FAMILY HISTORY OF MALIGNANT NEOPLASM OF PROSTATE: ICD-10-CM

## 2021-03-17 DIAGNOSIS — Z86.79 PERSONAL HISTORY OF OTHER DISEASES OF THE CIRCULATORY SYSTEM: ICD-10-CM

## 2021-03-17 DIAGNOSIS — Z80.8 FAMILY HISTORY OF MALIGNANT NEOPLASM OF OTHER ORGANS OR SYSTEMS: ICD-10-CM

## 2021-03-17 PROCEDURE — 99205 OFFICE O/P NEW HI 60 MIN: CPT

## 2021-03-17 NOTE — REASON FOR VISIT
[Initial Evaluation] : an initial evaluation [FreeTextEntry1] : For abnormal CT and MRI of the pancreas

## 2021-03-17 NOTE — HISTORY OF PRESENT ILLNESS
[None] : had no significant interval events [Heartburn] : denies heartburn [Nausea] : denies nausea [Vomiting] : denies vomiting [Diarrhea] : denies diarrhea [Constipation] : denies constipation [Yellow Skin Or Eyes (Jaundice)] : denies jaundice [Abdominal Pain] : denies abdominal pain [Abdominal Swelling] : denies abdominal swelling [Rectal Pain] : denies rectal pain [Abdominal Surgery] : abdominal surgery [GERD] : no gastroesophageal reflux disease [Hiatus Hernia] : no hiatus hernia [Peptic Ulcer Disease] : no peptic ulcer disease [Pancreatitis] : no pancreatitis [Cholelithiasis] : no cholelithiasis [Kidney Stone] : no kidney stone [Inflammatory Bowel Disease] : no inflammatory bowel disease [Irritable Bowel Syndrome] : no irritable bowel syndrome [Diverticulitis] : no diverticulitis [Alcohol Abuse] : no alcohol abuse [Malignancy] : no malignancy [Appendectomy] : no appendectomy [Cholecystectomy] : no cholecystectomy [de-identified] : This is the patient's first visit here [de-identified] : Patient presents for initial evaluation of abnormal CT and MRI of the pancreas.  She had a CT angio of her chest which is ordered for follow-up of a pulmonary embolism and was found to have a 1.2 cm low-attenuation lesion noted in the uncinate process of the pancreas she also had incidental liver cysts.  She had a subsequent MRI of the abdomen and pelvis which showed steatosis of the liver with scattered liver cysts and a focally dilated pancreatic duct in the head and the neck of the pancreas dilated up to 6.5 mm responding to the hypoattenuating lesion seen on recent CTA of the chest no downstream obstructing mass or abrupt transition however main duct dilatation may be seen in the setting of main duct intraductal papillary mucinous neoplasm and patient was referred here for evaluation for endoscopic ultrasound.  She denies abdominal pain or nausea or vomiting or unexplained weight loss or anorexia or prior history of pancreatitis or pancreatic pathology.  She has had a negative colonoscopy in the past for CRC screening but has had no previous upper endoscopies.

## 2021-03-17 NOTE — ASSESSMENT
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 Ortiz.  Patient will require preprocedure cardiac and pulmonary clearances and will need to hold her Eliquis 3 days prior to the above procedure.  She will be sent for preprocedure lab work which will include CMP and CBC with differential and PT/INR and CA 19–9.  The risk versus benefits of EGD and EUS were explained to the patient and her  who accompanied the patient today both of whom appeared to understand the above and were agreeable to proceeding with EGD with EUS and probable FNA.  Report to follow.  Her ASA classification is 3 and pending the above pulmonary and cardiac clearances she will be medically optimized for the proposed procedure.

## 2021-03-23 ENCOUNTER — NON-APPOINTMENT (OUTPATIENT)
Age: 71
End: 2021-03-23

## 2021-03-24 ENCOUNTER — NON-APPOINTMENT (OUTPATIENT)
Age: 71
End: 2021-03-24

## 2021-03-24 ENCOUNTER — APPOINTMENT (OUTPATIENT)
Dept: CARDIOLOGY | Facility: CLINIC | Age: 71
End: 2021-03-24
Payer: MEDICARE

## 2021-03-24 VITALS
WEIGHT: 176.6 LBS | OXYGEN SATURATION: 96 % | DIASTOLIC BLOOD PRESSURE: 70 MMHG | HEIGHT: 64 IN | HEART RATE: 45 BPM | TEMPERATURE: 97.8 F | SYSTOLIC BLOOD PRESSURE: 120 MMHG | BODY MASS INDEX: 30.15 KG/M2

## 2021-03-24 PROCEDURE — 93000 ELECTROCARDIOGRAM COMPLETE: CPT

## 2021-03-24 PROCEDURE — 99214 OFFICE O/P EST MOD 30 MIN: CPT

## 2021-03-24 RX ORDER — SILVER SULFADIAZINE 10 MG/G
1 CREAM TOPICAL
Qty: 50 | Refills: 0 | Status: DISCONTINUED | COMMUNITY
Start: 2021-03-02 | End: 2021-03-24

## 2021-03-24 RX ORDER — ALPRAZOLAM 0.25 MG/1
0.25 TABLET ORAL
Qty: 2 | Refills: 0 | Status: DISCONTINUED | COMMUNITY
Start: 2021-02-10 | End: 2021-03-24

## 2021-03-31 ENCOUNTER — RX RENEWAL (OUTPATIENT)
Age: 71
End: 2021-03-31

## 2021-04-01 LAB
ALBUMIN SERPL ELPH-MCNC: 4.3 G/DL
ALP BLD-CCNC: 85 U/L
ALT SERPL-CCNC: 21 U/L
ANION GAP SERPL CALC-SCNC: 13 MMOL/L
AST SERPL-CCNC: 22 U/L
BASOPHILS # BLD AUTO: 0.05 K/UL
BASOPHILS NFR BLD AUTO: 1 %
BILIRUB SERPL-MCNC: 1 MG/DL
BUN SERPL-MCNC: 14 MG/DL
CALCIUM SERPL-MCNC: 9.7 MG/DL
CANCER AG19-9 SERPL-ACNC: 7 U/ML
CHLORIDE SERPL-SCNC: 102 MMOL/L
CO2 SERPL-SCNC: 28 MMOL/L
CREAT SERPL-MCNC: 0.71 MG/DL
EOSINOPHIL # BLD AUTO: 0.09 K/UL
EOSINOPHIL NFR BLD AUTO: 1.8 %
GLUCOSE SERPL-MCNC: 90 MG/DL
HCT VFR BLD CALC: 42.6 %
HGB BLD-MCNC: 13.9 G/DL
IMM GRANULOCYTES NFR BLD AUTO: 0.2 %
INR PPP: 1.14 RATIO
LYMPHOCYTES # BLD AUTO: 1.91 K/UL
LYMPHOCYTES NFR BLD AUTO: 37.7 %
MAN DIFF?: NORMAL
MCHC RBC-ENTMCNC: 31.6 PG
MCHC RBC-ENTMCNC: 32.6 GM/DL
MCV RBC AUTO: 96.8 FL
MONOCYTES # BLD AUTO: 0.41 K/UL
MONOCYTES NFR BLD AUTO: 8.1 %
NEUTROPHILS # BLD AUTO: 2.59 K/UL
NEUTROPHILS NFR BLD AUTO: 51.2 %
PLATELET # BLD AUTO: 227 K/UL
POTASSIUM SERPL-SCNC: 4.4 MMOL/L
PROT SERPL-MCNC: 6.9 G/DL
PT BLD: 13.4 SEC
RBC # BLD: 4.4 M/UL
RBC # FLD: 13 %
SODIUM SERPL-SCNC: 143 MMOL/L
WBC # FLD AUTO: 5.06 K/UL

## 2021-04-08 ENCOUNTER — APPOINTMENT (OUTPATIENT)
Dept: PULMONOLOGY | Facility: CLINIC | Age: 71
End: 2021-04-08
Payer: MEDICARE

## 2021-04-08 VITALS
TEMPERATURE: 98.3 F | DIASTOLIC BLOOD PRESSURE: 77 MMHG | HEIGHT: 64 IN | SYSTOLIC BLOOD PRESSURE: 131 MMHG | BODY MASS INDEX: 29.88 KG/M2 | WEIGHT: 175 LBS | OXYGEN SATURATION: 98 % | HEART RATE: 57 BPM | RESPIRATION RATE: 14 BRPM

## 2021-04-08 PROCEDURE — 99204 OFFICE O/P NEW MOD 45 MIN: CPT

## 2021-04-08 RX ORDER — ROSUVASTATIN CALCIUM 5 MG/1
5 TABLET, FILM COATED ORAL
Qty: 90 | Refills: 0 | Status: DISCONTINUED | COMMUNITY
Start: 2020-06-23 | End: 2021-04-08

## 2021-04-08 NOTE — CONSULT LETTER
[Dear  ___] : Dear  [unfilled], [Consult Letter:] : I had the pleasure of evaluating your patient, [unfilled]. [Please see my note below.] : Please see my note below. [Consult Closing:] : Thank you very much for allowing me to participate in the care of this patient.  If you have any questions, please do not hesitate to contact me. [DrLucy  ___] : Dr. LEWIS [FreeTextEntry3] : Sincerely,\par \par Zaina Escalante MD\par [facility] Physician [facility]\par Pulmonary Medicine\par tel: [phone]\par fax: [phone]\par

## 2021-04-08 NOTE — PHYSICAL EXAM
[No Acute Distress] : no acute distress [Normal Oropharynx] : normal oropharynx [Normal Appearance] : normal appearance [No Neck Mass] : no neck mass [Normal Rate/Rhythm] : normal rate/rhythm [Normal S1, S2] : normal s1, s2 [No Murmurs] : no murmurs [No Resp Distress] : no resp distress [Clear to Auscultation Bilaterally] : clear to auscultation bilaterally [No Abnormalities] : no abnormalities [Benign] : benign [Normal Gait] : normal gait [No Clubbing] : no clubbing [No Cyanosis] : no cyanosis [Normal Color/ Pigmentation] : normal color/ pigmentation [FROM] : FROM [No Focal Deficits] : no focal deficits [Oriented x3] : oriented x3 [Normal Affect] : normal affect [TextBox_105] : trace left TJ

## 2021-04-08 NOTE — HISTORY OF PRESENT ILLNESS
[Former] : former [< 30 pack-years] : < 30 pack-years [Never] : never [TextBox_4] : Ms. KELLEY RUFF is a 70 year old woman with hx of submassive PE (June 2020, on Eliquis), HTN, HLD who is here for preoperative evaluation prior to endoscopy. \par \par Patient was found to have submassive PE/DVT with e/o right heart strain after presenting with syncopal episode. She was treated with tPA and has been maintained on Eliquis ever since. In Dec 2020, was found to have chronic LLE DVT. Hematologic workup concerning for protein S deficiency and possible pancreatic neoplasm. She is currently scheduled for EGD/EUS with Dr Ortiz. \par \par In terms of her pulmonary sx, patient reports feeling well. She denies any cough, SOB, RIVERA, exercise intolerance. She denies frequent pulmonary infections, hx of PAN, use of inhalers in the past.\par Most recent TTE from July 2020 - with mildly enlarged LA, normal LV function, mildly dilated RA, enlarged RV with normal RV function, no e/o pHTN\par \par PMH: HTN, HLD, glaucoma\par Meds:  Eliquis, Amlodipine\par All: NKDA\par SH: smoked 42 years ago, 1ppd x 8years\par FH: had 7 brothers and sisters - strong hx of blood clots and cardiac conditions\par PMD: JEFFERY MUNIZ\par Immunizations: Had pneumovax a year or two ago; had covid vaccine\par  [TextBox_11] : 1

## 2021-04-08 NOTE — ASSESSMENT
[FreeTextEntry1] :  70 year old woman with hx of submassive PE (June 2020, on Eliquis), HTN, HLD who is here for preoperative evaluation prior to endoscopy. She feels, well, denies any respiratory sx, saturating well on RA and has clear lungs on exam. Based on ARISCAT calculator, she is at low risk (1.6%) for perioperative pulmonary complications.\par Patient is able to proceed with procedure as planned without any further pulmonary testing.\par She is going to be transitioned to Lovenox injections for AC 3 days prior to procedure.\par \par All questions answered. Patient in agreement with plan. \par Follow up as needed.\par

## 2021-04-09 ENCOUNTER — OUTPATIENT (OUTPATIENT)
Dept: OUTPATIENT SERVICES | Facility: HOSPITAL | Age: 71
LOS: 1 days | Discharge: HOME | End: 2021-04-09

## 2021-04-09 DIAGNOSIS — Z90.49 ACQUIRED ABSENCE OF OTHER SPECIFIED PARTS OF DIGESTIVE TRACT: Chronic | ICD-10-CM

## 2021-04-09 DIAGNOSIS — I26.99 OTHER PULMONARY EMBOLISM WITHOUT ACUTE COR PULMONALE: ICD-10-CM

## 2021-04-11 DIAGNOSIS — Z01.818 ENCOUNTER FOR OTHER PREPROCEDURAL EXAMINATION: ICD-10-CM

## 2021-04-12 ENCOUNTER — NON-APPOINTMENT (OUTPATIENT)
Age: 71
End: 2021-04-12

## 2021-04-12 ENCOUNTER — APPOINTMENT (OUTPATIENT)
Dept: DISASTER EMERGENCY | Facility: CLINIC | Age: 71
End: 2021-04-12

## 2021-04-13 LAB — SARS-COV-2 N GENE NPH QL NAA+PROBE: NOT DETECTED

## 2021-04-14 ENCOUNTER — APPOINTMENT (OUTPATIENT)
Dept: HEMATOLOGY ONCOLOGY | Facility: CLINIC | Age: 71
End: 2021-04-14
Payer: MEDICARE

## 2021-04-14 ENCOUNTER — TRANSCRIPTION ENCOUNTER (OUTPATIENT)
Age: 71
End: 2021-04-14

## 2021-04-14 VITALS
HEIGHT: 64 IN | WEIGHT: 175 LBS | DIASTOLIC BLOOD PRESSURE: 81 MMHG | OXYGEN SATURATION: 97 % | BODY MASS INDEX: 29.88 KG/M2 | TEMPERATURE: 98 F | SYSTOLIC BLOOD PRESSURE: 134 MMHG | HEART RATE: 65 BPM

## 2021-04-14 DIAGNOSIS — K86.9 DISEASE OF PANCREAS, UNSPECIFIED: ICD-10-CM

## 2021-04-14 PROCEDURE — 99214 OFFICE O/P EST MOD 30 MIN: CPT

## 2021-04-14 NOTE — HISTORY OF PRESENT ILLNESS
[de-identified] : 70  year old female who was referred to our office due to a submassive PE in June 2020\par Patient had a s/p hospitalization at [facility] 6/4/20-6/8/20 for acute submassive PE. \par \par History significant for HTN, HLD, and glaucoma who originally presented to [facility] on 6/4/20 diagnosed with an acute submassive saddle PE with RV strain, as well as LLE DVT femoral, popliteal, posterior tibial, and peronal. \par Presented to [facility] following two episodes of witnessed syncopal episodes at home. Patient with a Provoked DVT s/p Fall and trauma to left leg \par \par Transferred to [facility] for EKOS treatment. No trauma to head during syncopal episodes. \par Enrolled in the PE study at [facility] ( peripheral low dose systemic TPA vs. ultrasound assisted catheter directed equivalent dose thrombolysis for treatment of submassive pulmonary embolism). Randomized to peripheral low dose systemic TPA, which she completed and was placed on heparin infusion.\par Transitioned to [facility] for discharge. \par \par  [de-identified] : Patient here for follow up with ,\par Die to indeterminate pancreatic lesion, plan for EUS tomorrow per GI \par She was transitioned to Lovenox 80 bid, with transition back to eliquis post procedure \par Ptaient aware of low Protein S level from last appointment and need to be on long term AC 2/2 to that \par \par No bleeding Issues\par \par CT Angio in Dec 2020, with NO evidence of PE, Small 1.2 cm indeterminate lesion in pancreas\par Doppler LE with chronic non occlusive thrombus to Left LE, no DVT in rt LE\par \par Hypercoaguble W/u done on 1/27/21: \par AT III WNL, \par Cardiolipin IgG < 5 Cardiolipin IgM 15.1 ( indeterminate) \par B2 Glycoprotein IgA< 5 , B2 Glycoprotein IgG < 5 , B2 Glycoprotein IgM < 5 \par PRotein S 41% Protein C142% WNL \par PRothrombin gene Mutation NORMAL \par Factor V leiden NORMAL\par \par HYPERCOAGUBLE w/u done in March 2021: \par Protein S: 31% \par Cardiolipin IG M 14, Cardiolipin Ig G < 5 \par \par Patient was drinking a bottle of wine a day when LABS Were done on 1/27/21. Since then she has cut down to 1.2 bottle wine

## 2021-04-14 NOTE — ASSESSMENT
[FreeTextEntry1] : Patient with an acute submassive saddle PE with RV strain, as well as LLE DVT femoral, popliteal, posterior tibial, and peronal. in June 2020 s/p EKOS at [facility] \par . Patient with a Provoked DVT s/p Fall and trauma to left leg in may 2020\par  \par Subsequent CT angio with no evidence of PE. Doppler LE with Chronic NOn occlusive clot in left LE\par FAMILY HISTORYMother with DVT with Heart attack? Sister with PE post brain surgery \par \par PE/ DVT \par PANCREATIC LESION \par \par - Hypercoaguble in Jan 2021  w/u with Indeterminate Cardiolipin IgM 15.1, Protein S slightly low at 41% \par - REPEAT IN March 2021:  Protein S: 31%  Cardiolipin IG M 14, Cardiolipin Ig G < 5 \par . Patient was drinking a bottle of wine a day when LABS Were done on 1/27/21. Since then she has cut down alcohol\par -  Increased risk of warfarin induced skin necrosis with Protein S deficiency, Avoid use/ Caution with use\par - Given that no strong Family h/o VTE, IN order to definitively diagnose a Protein S deficiency which is causing an increased risk of VTE, PRotein S level should usually be below 30 Howevr given chronicity of DVT will continue AC \par - MRI ABDOMEN: With mild dilalation of the main pancreatic duct in the head and neck meauring 6 mm without transion or enhancement \par Question of Main duct IPMN / papillary mucinous Neoplasm \par Patient saw Dr ochoa, plan for EUS per Dr Reis on 4/15/21 \par \par F/u in 3- 4  months\par \par \par \par

## 2021-04-15 ENCOUNTER — APPOINTMENT (OUTPATIENT)
Dept: GASTROENTEROLOGY | Facility: HOSPITAL | Age: 71
End: 2021-04-15

## 2021-04-15 ENCOUNTER — OUTPATIENT (OUTPATIENT)
Dept: OUTPATIENT SERVICES | Facility: HOSPITAL | Age: 71
LOS: 1 days | End: 2021-04-15
Payer: MEDICARE

## 2021-04-15 ENCOUNTER — TRANSCRIPTION ENCOUNTER (OUTPATIENT)
Age: 71
End: 2021-04-15

## 2021-04-15 ENCOUNTER — RESULT REVIEW (OUTPATIENT)
Age: 71
End: 2021-04-15

## 2021-04-15 DIAGNOSIS — R93.5 ABNORMAL FINDINGS ON DIAGNOSTIC IMAGING OF OTHER ABDOMINAL REGIONS, INCLUDING RETROPERITONEUM: ICD-10-CM

## 2021-04-15 DIAGNOSIS — K86.9 DISEASE OF PANCREAS, UNSPECIFIED: ICD-10-CM

## 2021-04-15 DIAGNOSIS — Z90.49 ACQUIRED ABSENCE OF OTHER SPECIFIED PARTS OF DIGESTIVE TRACT: Chronic | ICD-10-CM

## 2021-04-15 PROCEDURE — 88305 TISSUE EXAM BY PATHOLOGIST: CPT

## 2021-04-15 PROCEDURE — 88342 IMHCHEM/IMCYTCHM 1ST ANTB: CPT

## 2021-04-15 PROCEDURE — 43239 EGD BIOPSY SINGLE/MULTIPLE: CPT

## 2021-04-15 PROCEDURE — 88342 IMHCHEM/IMCYTCHM 1ST ANTB: CPT | Mod: 26

## 2021-04-15 PROCEDURE — 88305 TISSUE EXAM BY PATHOLOGIST: CPT | Mod: 26

## 2021-04-15 PROCEDURE — 43237 ENDOSCOPIC US EXAM ESOPH: CPT

## 2021-04-15 PROCEDURE — 43259 EGD US EXAM DUODENUM/JEJUNUM: CPT

## 2021-04-19 LAB — SURGICAL PATHOLOGY STUDY: SIGNIFICANT CHANGE UP

## 2021-04-27 ENCOUNTER — NON-APPOINTMENT (OUTPATIENT)
Age: 71
End: 2021-04-27

## 2021-06-15 NOTE — DISCUSSION/SUMMARY
[FreeTextEntry1] : A/P:\par \par 1. Hypertension- at goal, continue amlodipine 5 mg daily\par 2. Saddle PE with RV strain s/p TPA, tolerating Eliquis- echocardiogram 30 days from TPA administration showed mild RV dilatation and normal RV function. Possible provoked DVT ( s/p fall and trauma to left leg ) Repeat testing revealed no PE 1.2 CM lesion noted in the uncinate process of the pancreas. GI recommending EGD with EUS and possible FNA scheduled 4/15/21.\par 3. Left lower extremity DVT (femoral, popliteal, posetrior tibial, and peroneal)- Repeat LLE venous doppler revealed LLE DVT now chronic in the distal left femoral and popliteal veins (partially occlusive). continue utilizing compression stocking 20-30 mmhg, Continue Eliquis 5mg BID\par 4. Continue hematology f/u, hypercoagulable workup in progress. Preliminary results with possible protein S deficiency - Hem f/u scheduled for 4/14/21 \par 5. Asymptomatic sinus bradycardia \par \par As discussed with Dr. Saenz, Ms. Abbot is stable to proceed with EGD with EUS with possible FNA as scheduled 4/15/21 from a cardiac standpoint. Due to persistent DVT and possible protein S deficiency  to hold Eliquis for 3 days prior to procedure but would recommend lovenox bridging. To resume Eliquis as soon as deemed safe post procedure by surgery. \par \par Mahi Marin ANP-C \par  \par

## 2021-06-15 NOTE — HISTORY OF PRESENT ILLNESS
[FreeTextEntry1] : Old HPI: Ms. Casanova is a 70 year old female who presents today for post hospital follow up s/p hospitalization at [facility] 1/4/20-1/8/20 for acute submassive PE. \par \par History significant for HTN, HLD, and glaucoma who originally presented to [facility] on 6/4/20 diagnosed with an acute submassive saddle PE with RV strain, as well as LLE DVT femoral, popliteal, posterior tibial, and peronal. Presented to [facility] following two episodes of witnessed syncopal episodes at home. Transferred to [facility] for EKOS treatment. No trauma to head during syncopal episodes. Enrolled in the PE study at [facility] ( peripheral low dose systemic TPA vs. ultrasound assisted catheter directed equivalent dose thrombolysis for treatment of submassive pulmonary embolism). Randomized to peripheral low dose systemic TPA, which she completed and was placed on heparin infusion. Transitioned to Eliquis for discharge. Repeat TTE showed positive Mitchell's sign, Moderately enlarged RV, severely reduced RV systolic function. \par \par 9/23/2020\par Feels great\par no exertional chest pain or SOB, palpitations, dizziness or syncope\par Echo 30 days post reveals mildly enlarged RV and normal RV systolic function \par on eliquis\par wears compression stockings in LLE.\par \par 12/23/2020\par no exertional chest pain or SOB, palpitations, dizziness or syncope\par still on anticoagulation \par \par 3/21/21 During last follow up provoked DVT was suspected ( s/p fall and trauma to the left leg) but also family history of PE. Repoud Venous U/S performed 12/28/20 revealed no evidence of DVT in the right LE, DVT present in the distal left femoral and popliteal vein partially occlusive. CTA repeated 12/29/20 revealed no PE, 1.2 CM lesion noted in the uncinate process of the pancreas- further evaluation with MRI was recommended ordered by hematology, small low attenuation lesions in the liver. Hematology performed hypercoagulable work up with indeterminate results with slightly low protein S levels. Continued AC recommended. Patient was referred to GI for evaluation of the possible papillary mucinous neoplasm. EGD with EUS and possible FNA to be done at [facility] by our advanced endoscopist Dr. Ortiz. She reports she has been feeling well. Denies chest pain, shortness of breath, RIVERA, lightheadedness, dizziness, near syncope, syncope. Has no exercise limitation walks several blocks without RIVERA. Notes that has intermittent mild left LE edema, for which she wears compression stocking with resolution. Tolerating Eliquis without complaints of easy bruising, bleeding, or falls.

## 2021-06-15 NOTE — REVIEW OF SYSTEMS
[Lower Ext Edema] : lower extremity edema [Feeling Fatigued] : not feeling fatigued [Headache] : no headache [Shortness Of Breath] : no shortness of breath [Dyspnea on exertion] : not dyspnea during exertion [Chest  Pressure] : no chest pressure [Chest Pain] : no chest pain [Palpitations] : no palpitations [Dizziness] : no dizziness [Easy Bleeding] : no tendency for easy bleeding [Easy Bruising] : no tendency for easy bruising

## 2021-06-23 ENCOUNTER — APPOINTMENT (OUTPATIENT)
Dept: CARDIOLOGY | Facility: CLINIC | Age: 71
End: 2021-06-23
Payer: MEDICARE

## 2021-06-23 ENCOUNTER — NON-APPOINTMENT (OUTPATIENT)
Age: 71
End: 2021-06-23

## 2021-06-23 VITALS
BODY MASS INDEX: 30.05 KG/M2 | HEART RATE: 46 BPM | RESPIRATION RATE: 16 BRPM | DIASTOLIC BLOOD PRESSURE: 70 MMHG | WEIGHT: 176 LBS | HEIGHT: 64 IN | TEMPERATURE: 98.2 F | SYSTOLIC BLOOD PRESSURE: 128 MMHG | OXYGEN SATURATION: 97 %

## 2021-06-23 PROCEDURE — 93000 ELECTROCARDIOGRAM COMPLETE: CPT

## 2021-06-23 PROCEDURE — 99215 OFFICE O/P EST HI 40 MIN: CPT

## 2021-06-23 RX ORDER — ENOXAPARIN SODIUM 100 MG/ML
80 INJECTION SUBCUTANEOUS
Qty: 10 | Refills: 0 | Status: DISCONTINUED | COMMUNITY
Start: 2021-03-24 | End: 2021-06-23

## 2021-06-23 NOTE — PHYSICAL EXAM
[General Appearance - Well Developed] : well developed [General Appearance - Well Nourished] : well nourished [] : no respiratory distress [Respiration, Rhythm And Depth] : normal respiratory rhythm and effort [Auscultation Breath Sounds / Voice Sounds] : lungs were clear to auscultation bilaterally [Heart Rate And Rhythm] : heart rate and rhythm were normal [Heart Sounds] : normal S1 and S2 [Murmurs] : no murmurs present [Arterial Pulses Normal] : the arterial pulses were normal [Abnormal Walk] : normal gait [Oriented To Time, Place, And Person] : oriented to person, place, and time [FreeTextEntry1] : wears mask

## 2021-06-23 NOTE — HISTORY OF PRESENT ILLNESS
[FreeTextEntry1] : Ms. Casanova is a 69 year old female who presents today for post hospital follow up s/p hospitalization at [facility] 1/4/20-1/8/20 for acute submassive PE.  \par \par History significant for HTN, HLD, and glaucoma who originally presented to [facility] on 6/4/20 diagnosed with an acute submassive saddle PE with RV strain, as well as LLE DVT femoral, popliteal, posterior tibial, and peronal. Presented to [facility] following two episodes of witnessed syncopal episodes at home. Transferred to [facility] for EKOS treatment. No trauma to head during syncopal episodes. Enrolled in the PE study at [facility] ( peripheral low dose systemic TPA vs. ultrasound assisted catheter directed equivalent dose thrombolysis for treatment of submassive pulmonary embolism).  Randomized to peripheral low dose systemic TPA, which she completed and was placed on heparin infusion. Transitioned to Eliquis for discharge. Repeat TTE showed positive Mitchell's sign, Moderately enlarged RV, severely reduced RV systolic function. \par \par 9/23/2020\par Feels great\par no exertional chest pain or  SOB, palpitations, dizziness or syncope\par Echo 30 days post reveals mildly enlarged RV and normal RV systolic function \par on eliquis\par wears compression stockings in LLE.\par \par 12/23/2020\par no exertional chest pain or  SOB, palpitations, dizziness or syncope\par still on anticoagulation \par \par \par 6/23/2021\par Returns for follow up . no exertional chest pain or  SOB, palpitations, dizziness or syncope\par going for cataract surgery \par uses comporession stockings. \par

## 2021-06-23 NOTE — ASSESSMENT
[FreeTextEntry1] : Ms. Casanova is a 69 year old female who presents today for post hospital follow up s/p hospitalization at [facility] 1/4/20-1/8/20 for acute submassive PE.  \par \par History significant for HTN, HLD, and glaucoma who originally presented to [facility] on 6/4/20 diagnosed with an acute submassive saddle PE with RV strain, as well as LLE DVT femoral, popliteal, posterior tibial, and peronal. Presented to [facility] following two episodes of witnessed syncopal episodes at home. Transferred to [facility] for EKOS treatment. No trauma to head during syncopal episodes. Enrolled in the PE study at [facility] ( peripheral low dose systemic TPA vs. ultrasound assisted catheter directed equivalent dose thrombolysis for treatment of submassive pulmonary embolism).  Randomized to peripheral low dose systemic TPA, which she completed and was placed on heparin infusion. Transitioned to Eliquis for discharge.\par \par Recommendation:\par \par 1. Saddle PE with RV strain s/p TPA, tolerating Eliquis- echocardiogram 30 days from TPA administration as scheduled showed mild RV dilatation and normal RV function . likely provoked DVT  ( s/p fall and trauma to left leg ) . Patient has however  a family history of PE . Decision to continue with lifelong anticoagulation ( low protein S, FH of DVT/PE\par 2.  Left lower extremity DVT (femoral, popliteal, posetrior tibial, and peroneal)  history with persistent distal left femoral and popliteal veins thrombosis  ( partially occlusion) , continue compression stockings. \par \par low risk for perioperative cardiac events and there is no contraindications to proceed with cataract surgery\par will need to be on lovenox 1 mg/kg every 12 hours till night before surgery. Stop eliquis 3 days prior to surgery . \par \par Preoperative assessment can be sent to surgeon \par \par \par \par

## 2021-07-20 LAB
ANION GAP SERPL CALC-SCNC: 12 MMOL/L
BUN SERPL-MCNC: 17 MG/DL
CALCIUM SERPL-MCNC: 9.5 MG/DL
CHLORIDE SERPL-SCNC: 103 MMOL/L
CO2 SERPL-SCNC: 27 MMOL/L
CREAT SERPL-MCNC: 0.79 MG/DL
GLUCOSE SERPL-MCNC: 91 MG/DL
POTASSIUM SERPL-SCNC: 4.3 MMOL/L
SODIUM SERPL-SCNC: 142 MMOL/L

## 2021-07-28 ENCOUNTER — APPOINTMENT (OUTPATIENT)
Dept: CARDIOLOGY | Facility: CLINIC | Age: 71
End: 2021-07-28
Payer: MEDICARE

## 2021-07-28 PROCEDURE — 93306 TTE W/DOPPLER COMPLETE: CPT

## 2021-08-06 ENCOUNTER — APPOINTMENT (OUTPATIENT)
Dept: CARDIOLOGY | Facility: CLINIC | Age: 71
End: 2021-08-06
Payer: MEDICARE

## 2021-08-06 VITALS
WEIGHT: 175.04 LBS | DIASTOLIC BLOOD PRESSURE: 66 MMHG | TEMPERATURE: 98.5 F | BODY MASS INDEX: 29.88 KG/M2 | HEIGHT: 64 IN | OXYGEN SATURATION: 98 % | HEART RATE: 54 BPM | SYSTOLIC BLOOD PRESSURE: 126 MMHG

## 2021-08-06 PROCEDURE — 99214 OFFICE O/P EST MOD 30 MIN: CPT

## 2021-08-19 ENCOUNTER — OUTPATIENT (OUTPATIENT)
Dept: OUTPATIENT SERVICES | Facility: HOSPITAL | Age: 71
LOS: 1 days | Discharge: HOME | End: 2021-08-19

## 2021-08-19 DIAGNOSIS — Z90.49 ACQUIRED ABSENCE OF OTHER SPECIFIED PARTS OF DIGESTIVE TRACT: Chronic | ICD-10-CM

## 2021-08-19 DIAGNOSIS — I26.99 OTHER PULMONARY EMBOLISM WITHOUT ACUTE COR PULMONALE: ICD-10-CM

## 2021-08-20 ENCOUNTER — APPOINTMENT (OUTPATIENT)
Dept: HEMATOLOGY ONCOLOGY | Facility: CLINIC | Age: 71
End: 2021-08-20
Payer: MEDICARE

## 2021-08-20 VITALS
HEART RATE: 54 BPM | OXYGEN SATURATION: 95 % | HEIGHT: 64 IN | SYSTOLIC BLOOD PRESSURE: 127 MMHG | BODY MASS INDEX: 29.73 KG/M2 | DIASTOLIC BLOOD PRESSURE: 79 MMHG | WEIGHT: 174.13 LBS

## 2021-08-20 PROCEDURE — 99214 OFFICE O/P EST MOD 30 MIN: CPT

## 2021-08-20 NOTE — ASSESSMENT
[FreeTextEntry1] : Patient with an acute submassive saddle PE with RV strain, as well as LLE DVT femoral, popliteal, posterior tibial, and peronal. in June 2020 s/p EKOS at [facility] \par . Patient with a Provoked DVT s/p Fall and trauma to left leg in may 2020\par  \par Subsequent CT angio with no evidence of PE. Doppler LE with Chronic NOn occlusive clot in left LE\par FAMILY HISTORY Mother with DVT with Heart attack? Sister with PE post brain surgery \par \par PE/ DVT \par PANCREATIC LESION ( IPMN)\par \par - Hypercoagulable in Jan 2021  w/u with Indeterminate Cardiolipin IgM 15.1, Protein S slightly low at 41%  \par - REPEAT IN March 2021:  Protein S: 31%  Cardiolipin IG M 14, Cardiolipin Ig G < 5 \par . Patient was drinking a bottle of wine a day when LABS Were done on 1/27/21. Since then she has cut down alcohol. 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 Ortiz repeat MRI liver in 3 months and FU with GI\par FU with me in 6 months Left arm;

## 2021-08-20 NOTE — HISTORY OF PRESENT ILLNESS
[de-identified] : 70  year old female who was referred to our office due to a submassive PE in June 2020\par Patient had a s/p hospitalization at [facility] 6/4/20-6/8/20 for acute submassive PE. \par \par History significant for HTN, HLD, and glaucoma who originally presented to [facility] on 6/4/20 diagnosed with an acute submassive saddle PE with RV strain, as well as LLE DVT femoral, popliteal, posterior tibial, and peronal. \par Presented to [facility] following two episodes of witnessed syncopal episodes at home. Patient with a Provoked DVT s/p Fall and trauma to left leg \par \par Transferred to [facility] for EKOS treatment. No trauma to head during syncopal episodes. \par Enrolled in the PE study at [facility] ( peripheral low dose systemic TPA vs. ultrasound assisted catheter directed equivalent dose thrombolysis for treatment of submassive pulmonary embolism). Randomized to peripheral low dose systemic TPA, which she completed and was placed on heparin infusion.\par Transitioned to [facility] for discharge. \par \par CT Angio in Dec 2020, with NO evidence of PE, Small 1.2 cm indeterminate lesion in pancreas\par Doppler LE with chronic non occlusive thrombus to Left LE, no DVT in rt LE [de-identified] : Patient here for follow up with ,\par \par Patient had a EGD and EUS on 4/15/21: gastric biopsy consistent with reactive gastropathy, immuostatins for H. Pylori are negative\par EUS with a focally dilated pancreatic duct in the head of the pancreas with no evidence of mass lesion, no mural nodularity, mild gastritis, most likely there is a main duct IPMN, Plan per GI with  MRI with MRCP in 3 months\par \par Patient is doing well. She had cataract surgery recently. She is still taking Eliquis 5 mg BID. She is getting bruising and bleeding.\par \par Patient saw cardiologist. Plan for repeat doppler of lower extremity in 9/2021.\par She is still drinking a couple of glasses of wine at night 5 days a week.\par \par \par \par Hypercoaguble W/u done on 1/27/21: \par AT III WNL, \par Cardiolipin IgG < 5 Cardiolipin IgM 15.1 ( indeterminate) \par B2 Glycoprotein IgA< 5 , B2 Glycoprotein IgG < 5 , B2 Glycoprotein IgM < 5 \par PRotein S 41% Protein C142% WNL \par PRothrombin gene Mutation NORMAL \par Factor V leiden NORMAL\par \par HYPERCOAGUBLE w/u done in March 2021: \par Protein S: 31% \par Cardiolipin IG M 14, Cardiolipin Ig G < 5 \par \par Patient was drinking a bottle of wine a day when LABS Were done on 1/27/21. Since then she has cut down to 1.2 bottle wine

## 2021-08-20 NOTE — PHYSICAL EXAM
[Normal] : affect appropriate [de-identified] : left leg slight increased tightness compared to right on palpation

## 2021-08-20 NOTE — ADDENDUM
[FreeTextEntry1] : Documented by Alyson Armando acting as scribe for Dr. Zarate on 08/20/2021 \par \par All Medical record entries made by the Scribe were at my, Dr. Zarate's, direction and personally dictated by me on 08/20/2021 . I have reviewed the chart and agree that the record accurately reflects my personal performance of the history, physical exam, assessment and plan. I have also personally directed, reviewed, and agreed with the discharge instructions.\par

## 2021-09-01 ENCOUNTER — APPOINTMENT (OUTPATIENT)
Dept: CARDIOLOGY | Facility: CLINIC | Age: 71
End: 2021-09-01
Payer: MEDICARE

## 2021-09-01 PROCEDURE — 93970 EXTREMITY STUDY: CPT

## 2021-12-13 NOTE — ED ADULT TRIAGE NOTE - CHIEF COMPLAINT QUOTE
Lisdexamfetamine ( Vyvanse 70 mg) capsule      Take 1 capsule by mouth every morning  Last Written Prescription Date:  11-18-21  Last Fill Quantity: 30 capsule,   # refills: 0  Last Office Visit: 10-26-21  Future Office visit:    Next 5 appointments (look out 90 days)    Dec 16, 2021 11:30 AM  Return Visit with Shamar Escamilla DC  [facility] ([facility] ) [address]  [facility] [address]  [phone]   Dec 28, 2021  1:00 PM  (Arrive by 12:45 PM)  Return Visit with Laquita Fernandez MD  [facility] ([facility] ) [address] [facility] [id number] [phone]              Patient A&Ox4, transfer from [facility] for saddle PE, has heparin drip in progress, negative obvious gross bleeding. Dr. Pal met patient at bedside, patient placed in critical care.

## 2021-12-15 ENCOUNTER — NON-APPOINTMENT (OUTPATIENT)
Age: 71
End: 2021-12-15

## 2021-12-15 ENCOUNTER — APPOINTMENT (OUTPATIENT)
Dept: CARDIOLOGY | Facility: CLINIC | Age: 71
End: 2021-12-15
Payer: MEDICARE

## 2021-12-15 VITALS
RESPIRATION RATE: 18 BRPM | BODY MASS INDEX: 30.39 KG/M2 | WEIGHT: 178 LBS | OXYGEN SATURATION: 97 % | TEMPERATURE: 98.2 F | HEART RATE: 50 BPM | DIASTOLIC BLOOD PRESSURE: 83 MMHG | SYSTOLIC BLOOD PRESSURE: 155 MMHG | HEIGHT: 64 IN

## 2021-12-15 PROCEDURE — 99215 OFFICE O/P EST HI 40 MIN: CPT

## 2021-12-15 PROCEDURE — 93000 ELECTROCARDIOGRAM COMPLETE: CPT

## 2021-12-15 NOTE — HISTORY OF PRESENT ILLNESS
[FreeTextEntry1] : 70 F with history of submassive saddle PE with RV strain, as well as LLE DVT (femoral, popliteal, posterior tibial, and peroneal) 6/2020: (was enrolled in PE study (randomized to peripheral TPA) - discharged on Eliquis; also with history of HTN, HLD, and glaucoma who is here for routine f/u. \par \par Reports of feeling good. Denies chest pain, shortness of breath, fatigue, palpitations, syncope or near syncope, orthopnea and PND. Compliant with medications. No bleeding episodes from AC. Has a bruise around the left foot, happened after a lot of walking wearing compression stockings while she was in [place] 2 days ago. Not wearing compression stockings today. \par \par 12/15/2021\par Returns for follow up. \par no exertional chest pain or  SOB, palpitations, dizziness or syncope\par compliant with eliquis , no bleeding \par no LE edema , wears compression stockings. \par willt travel to [place] in feb/2022\par \par

## 2021-12-15 NOTE — DISCUSSION/SUMMARY
[Patient] : the patient [Risks] : risks [Benefits] : benefits [With ___] : with [unfilled] [FreeTextEntry1] : 70 F with history of submassive saddle PE with RV strain, as well as LLE DVT (femoral, popliteal, posterior tibial, and peroneal) 6/2020: (was enrolled in PE study (randomized to peripheral TPA) - discharged on Eliquis; also with history of HTN, HLD, and glaucoma who is here for routine f/u. Reports of feeling good. Denies chest pain, shortness of breath, fatigue, palpitations, syncope or near syncope, orthopnea and PND. Compliant with medications. No bleeding episodes from AC. Has a bruise around the left foot, happened after a lot of walking while she was in [place] 2 days ago. Not wearing compression stockings today. Has trace LLE edema\par \par Plan:\par \par 1. 6 min walk test performed - did well\par 2. Continue with AC: lifelong. family history of PE and with low protein S\par 3. Still has LLE DVT on 12/2020 venous DVT. Will repeat\par 4. BP to goal. Continue with amlodipine\par 5. May rest from compression stockings for 1 week, then resume at least 6 hours a day while she's on her feet.\par 6.f/u with Dr. Saenz as scheduled or earlier if needed\par \par Antwan Alvarado, NP

## 2021-12-15 NOTE — ASSESSMENT
[FreeTextEntry1] : 70 F with history of submassive saddle PE with RV strain, as well as LLE DVT (femoral, popliteal, posterior tibial, and peroneal) 6/2020: (was enrolled in PE study (randomized to peripheral TPA) - discharged on Eliquis; also with history of HTN, HLD, and glaucoma who is here for routine f/u. Reports of feeling good. Denies chest pain, shortness of breath, fatigue, palpitations, syncope or near syncope, orthopnea and PND. Compliant with medications. No bleeding episodes from AC. Has a bruise around the left foot, happened after a lot of walking while she was in [place] 2 days ago. Not wearing compression stockings today. Has trace LLE edema\par \par Plan:\par \par 2. Continue with AC: lifelong. family history of PE and with low protein S\par 3. Still has LLE DVT in distal femoral and popliteal ( chronic) , continue compression stockings\par 4. HTN uncontrolled but usually controlled at home.  Continue with amlodipine 5 mg \par 5- Hyperlipidemia , will get lipid profile from PCP , goal LDL < 130 preferrably < 100 \par tried rosuvastatin , atorvastatin with proximal myalgia\par will consider Praluent if LDL > 130 \par low risk for perioperative cardiac events going for cataract surgery , stop eliquis the night before the surgery and resume eliquis as soon as possible after the surgery . \par \par Returns in 9 months.

## 2022-02-14 ENCOUNTER — OUTPATIENT (OUTPATIENT)
Dept: OUTPATIENT SERVICES | Facility: HOSPITAL | Age: 72
LOS: 1 days | Discharge: HOME | End: 2022-02-14

## 2022-02-14 DIAGNOSIS — I26.99 OTHER PULMONARY EMBOLISM WITHOUT ACUTE COR PULMONALE: ICD-10-CM

## 2022-02-14 DIAGNOSIS — Z90.49 ACQUIRED ABSENCE OF OTHER SPECIFIED PARTS OF DIGESTIVE TRACT: Chronic | ICD-10-CM

## 2022-02-15 ENCOUNTER — APPOINTMENT (OUTPATIENT)
Dept: HEMATOLOGY ONCOLOGY | Facility: CLINIC | Age: 72
End: 2022-02-15

## 2022-05-06 NOTE — H&P ADULT - SKIN/BREAST
[Home] : at home, [unfilled] , at the time of the visit. [Medical Office: ([facility])___] : at the medical office located in  [FreeTextEntry8] : 39 yr old F presenting for covid 19\par pt had a covid test pending pcr\par patient partner had allergy symptoms 3 days ago which may have had covid \par son has + tests \par patient has pain of body \par fever, congestion, \par no cough, \par mild sob noted \par patient had covid vaccine 3rd \par  negative

## 2022-08-15 ENCOUNTER — RX RENEWAL (OUTPATIENT)
Age: 72
End: 2022-08-15

## 2022-09-14 ENCOUNTER — APPOINTMENT (OUTPATIENT)
Dept: CARDIOLOGY | Facility: CLINIC | Age: 72
End: 2022-09-14

## 2022-09-14 VITALS
WEIGHT: 177 LBS | DIASTOLIC BLOOD PRESSURE: 76 MMHG | SYSTOLIC BLOOD PRESSURE: 125 MMHG | HEART RATE: 62 BPM | TEMPERATURE: 98.1 F | OXYGEN SATURATION: 97 % | BODY MASS INDEX: 30.38 KG/M2

## 2022-09-14 PROCEDURE — 99215 OFFICE O/P EST HI 40 MIN: CPT

## 2022-09-14 PROCEDURE — 93000 ELECTROCARDIOGRAM COMPLETE: CPT

## 2022-09-14 RX ORDER — DORZOLAMIDE HYDROCHLORIDE AND TIMOLOL MALEATE 20; 5 MG/ML; MG/ML
22.3-6.8 SOLUTION/ DROPS OPHTHALMIC TWICE DAILY
Refills: 0 | Status: DISCONTINUED | COMMUNITY
Start: 2020-06-23 | End: 2022-09-14

## 2022-09-14 NOTE — ASSESSMENT
[FreeTextEntry1] : 70 F with history of submassive saddle PE with RV strain, as well as LLE DVT (femoral, popliteal, posterior tibial, and peroneal) 6/2020: (was enrolled in PE study (randomized to peripheral TPA) - discharged on Eliquis; also with history of HTN, HLD, and glaucoma who is here for routine f/u. \par \par Plan:\par \par 1. Continue with AC: lifelong. family history of PE and with low protein S\par 2. HTN controlled.  Continue with amlodipine 5 mg \par 3- Hyperlipidemia ,  , HDL 73, \par tried rosuvastatin , atorvastatin with proximal myalgia,. will try pravastatin and if developed myalgia , will consider praluent ,repatha\par \par yearly follow up

## 2022-09-14 NOTE — HISTORY OF PRESENT ILLNESS
[FreeTextEntry1] : 70 F with history of submassive saddle PE with RV strain, as well as LLE DVT (femoral, popliteal, posterior tibial, and peroneal) 6/2020: (was enrolled in PE study (randomized to peripheral TPA) - discharged on Eliquis; also with history of HTN, HLD, and glaucoma who is here for routine f/u. \par \par Reports of feeling good. Denies chest pain, shortness of breath, fatigue, palpitations, syncope or near syncope, orthopnea and PND. Compliant with medications. No bleeding episodes from AC. Has a bruise around the left foot, happened after a lot of walking wearing compression stockings while she was in [place] 2 days ago. Not wearing compression stockings today. \par \par 12/15/2021\par Returns for follow up. \par no exertional chest pain or  SOB, palpitations, dizziness or syncope\par compliant with eliquis , no bleeding \par no LE edema , wears compression stockings. \par willt travel to [place] in feb/2022\par \par 9/14/2022\par Returns for follow up. \par no exertional chest pain or  SOB, palpitations, dizziness or syncope\par mild LLE edema \par compliant with eliquis , no bleeding \par , , HDL 73, \par had proximal myalgia with prior statins ( rosuvastatin , atorvastatin ) \par \par \par

## 2023-02-28 NOTE — ED PROVIDER NOTE - CADM POA URETHRAL CATHETER
Surgery folder given to patient. Patient has a nurse visit scheduled in April, teaching will be done at that time. Recommended patient review folder. Patient verbalizes understanding.   No

## 2023-03-29 ENCOUNTER — NON-APPOINTMENT (OUTPATIENT)
Age: 73
End: 2023-03-29

## 2023-03-29 ENCOUNTER — APPOINTMENT (OUTPATIENT)
Dept: CARDIOLOGY | Facility: CLINIC | Age: 73
End: 2023-03-29
Payer: MEDICARE

## 2023-03-29 VITALS
HEART RATE: 51 BPM | BODY MASS INDEX: 30.61 KG/M2 | HEIGHT: 64 IN | DIASTOLIC BLOOD PRESSURE: 71 MMHG | WEIGHT: 179.31 LBS | SYSTOLIC BLOOD PRESSURE: 119 MMHG | TEMPERATURE: 97.9 F | OXYGEN SATURATION: 95 %

## 2023-03-29 DIAGNOSIS — R60.0 LOCALIZED EDEMA: ICD-10-CM
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
## 2023-03-29 PROCEDURE — 93000 ELECTROCARDIOGRAM COMPLETE: CPT

## 2023-03-29 PROCEDURE — 99214 OFFICE O/P EST MOD 30 MIN: CPT

## 2023-04-13 ENCOUNTER — APPOINTMENT (OUTPATIENT)
Dept: CARDIOLOGY | Facility: CLINIC | Age: 73
End: 2023-04-13
Payer: MEDICARE

## 2023-04-13 PROCEDURE — 93970 EXTREMITY STUDY: CPT

## 2023-04-26 ENCOUNTER — NON-APPOINTMENT (OUTPATIENT)
Age: 73
End: 2023-04-26

## 2023-04-26 ENCOUNTER — APPOINTMENT (OUTPATIENT)
Dept: OBGYN | Facility: CLINIC | Age: 73
End: 2023-04-26
Payer: MEDICARE

## 2023-04-26 VITALS
WEIGHT: 173 LBS | OXYGEN SATURATION: 96 % | TEMPERATURE: 98.5 F | RESPIRATION RATE: 16 BRPM | HEIGHT: 64 IN | BODY MASS INDEX: 29.53 KG/M2 | DIASTOLIC BLOOD PRESSURE: 68 MMHG | SYSTOLIC BLOOD PRESSURE: 148 MMHG | HEART RATE: 72 BPM

## 2023-04-26 DIAGNOSIS — Z12.39 ENCOUNTER FOR OTHER SCREENING FOR MALIGNANT NEOPLASM OF BREAST: ICD-10-CM

## 2023-04-26 PROCEDURE — G0101: CPT

## 2023-04-26 NOTE — HISTORY OF PRESENT ILLNESS
[FreeTextEntry1] : HPI: Patient is a 73yo female presenting for her well woman exam. \par Patient is without complaints today.\par \par ROS: neg unless specified in HPI\par \par Gyn Hx: \par Menopause: No HRT,  No PMB\par No known fibroids, ovarian cysts, or other gynecologic problems\par Pap: denies h/o abnormal pap\par Breast: denies h/o abnormal mammogram\par \par PMH:\par PSH:\par Meds:\par All:\par FH: Denies any breast, gynecologic, or GI cancers in the family\par SH:\par \par Additional Exam: \par Gyn: \par Normal atrophic external genitalia, atrophic vaginal introitus. Pt declined speculum exam and pap today\par \par Breast: No lymphadenopathy in the neck, chest wall, bilateral supraclavicular, infraclavicular, and bilateral axillary areas. \par No overt asymmetry in bilateral breast contour with normal appearing skin and normal appearing nipple areolar complex bilaterally. \par No nipple discharge expressed.\par \par A/P: 73yo female here for annual exam\par Gyn Screening:\par - Pap: Pt is 72, pt wishes to discontinuation screening\par \par Breast Screening: \par - Discussed self-breast exam\par - Clinical breast exam performed\par - Mammogram for this year\par \par AHM: CV, Pulmonary, Endocrine, GI, Bone Screening, Vitamin supplementation, and Immunizations with PCP\par \par RTC 1 year for annual well woman exam\par WILFREDO Cedillo MD\par

## 2023-05-16 ENCOUNTER — RX RENEWAL (OUTPATIENT)
Age: 73
End: 2023-05-16

## 2023-08-11 ENCOUNTER — RX RENEWAL (OUTPATIENT)
Age: 73
End: 2023-08-11

## 2023-08-11 RX ORDER — APIXABAN 5 MG/1
5 TABLET, FILM COATED ORAL
Qty: 180 | Refills: 3 | Status: ACTIVE | COMMUNITY
Start: 2020-06-23 | End: 1900-01-01

## 2023-09-20 ENCOUNTER — APPOINTMENT (OUTPATIENT)
Dept: CARDIOLOGY | Facility: CLINIC | Age: 73
End: 2023-09-20
Payer: MEDICARE

## 2023-09-20 VITALS
DIASTOLIC BLOOD PRESSURE: 75 MMHG | BODY MASS INDEX: 29.37 KG/M2 | HEIGHT: 64 IN | OXYGEN SATURATION: 98 % | HEART RATE: 58 BPM | WEIGHT: 172 LBS | SYSTOLIC BLOOD PRESSURE: 153 MMHG

## 2023-09-20 DIAGNOSIS — I10 ESSENTIAL (PRIMARY) HYPERTENSION: ICD-10-CM

## 2023-09-20 PROCEDURE — 93000 ELECTROCARDIOGRAM COMPLETE: CPT

## 2023-09-20 PROCEDURE — 99214 OFFICE O/P EST MOD 30 MIN: CPT

## 2024-02-07 ENCOUNTER — APPOINTMENT (OUTPATIENT)
Dept: CARDIOLOGY | Facility: CLINIC | Age: 74
End: 2024-02-07
Payer: MEDICARE

## 2024-02-07 VITALS
WEIGHT: 172 LBS | SYSTOLIC BLOOD PRESSURE: 130 MMHG | BODY MASS INDEX: 29.37 KG/M2 | HEIGHT: 64 IN | TEMPERATURE: 98.7 F | DIASTOLIC BLOOD PRESSURE: 84 MMHG | HEART RATE: 63 BPM | OXYGEN SATURATION: 98 %

## 2024-02-07 DIAGNOSIS — Z86.718 PERSONAL HISTORY OF OTHER VENOUS THROMBOSIS AND EMBOLISM: ICD-10-CM

## 2024-02-07 DIAGNOSIS — Z86.711 PERSONAL HISTORY OF PULMONARY EMBOLISM: ICD-10-CM

## 2024-02-07 DIAGNOSIS — E78.5 HYPERLIPIDEMIA, UNSPECIFIED: ICD-10-CM

## 2024-02-07 PROCEDURE — 93000 ELECTROCARDIOGRAM COMPLETE: CPT

## 2024-02-07 PROCEDURE — 99215 OFFICE O/P EST HI 40 MIN: CPT

## 2024-02-07 NOTE — REVIEW OF SYSTEMS
[Lower Ext Edema] : lower extremity edema [Negative] : Psychiatric [SOB] : no shortness of breath [Dyspnea on exertion] : not dyspnea during exertion [Chest Discomfort] : no chest discomfort [Leg Claudication] : no intermittent leg claudication [Palpitations] : no palpitations [Orthopnea] : no orthopnea [PND] : no PND [Syncope] : no syncope [Easy Bleeding] : no tendency for easy bleeding

## 2024-02-07 NOTE — CARDIOLOGY SUMMARY
[de-identified] : 9/20/2023  NSR, within normal  [de-identified] : 7/2021 EF 60-65%, grade I DD, mildly dilated right atrium, RV mildly enlarged, RV systolic function is normal , no significant valvular abnormality

## 2024-02-07 NOTE — HISTORY OF PRESENT ILLNESS
[FreeTextEntry1] : 70 F with history of submassive saddle PE with RV strain, as well as LLE DVT (femoral, popliteal, posterior tibial, and peroneal) 6/2020: (was enrolled in PE study (randomized to peripheral TPA) - discharged on Eliquis; also with history of HTN, HLD, and glaucoma who is here for routine f/u.   Reports of feeling good. Denies chest pain, shortness of breath, fatigue, palpitations, syncope or near syncope, orthopnea and PND. Compliant with medications. No bleeding episodes from AC. Has a bruise around the left foot, happened after a lot of walking wearing compression stockings while she was in [place] 2 days ago. Not wearing compression stockings today.   12/15/2021 Returns for follow up.  no exertional chest pain or  SOB, palpitations, dizziness or syncope compliant with eliquis , no bleeding  no LE edema , wears compression stockings.  willt travel to [place] in feb/2022 9/14/2022 Returns for follow up.  no exertional chest pain or  SOB, palpitations, dizziness or syncope mild LLE edema  compliant with eliquis , no bleeding  , , HDL 73,  had proximal myalgia with prior statins ( rosuvastatin , atorvastatin )    03/29/2023 Presents for f/u due to new RLE edema has chronic mild LE edema in the left leg from prior DVT, wears B/L  compression stockings   Returned from a cruise to the [facility] on [place] 3/26, with significant B/L LE edema the last night of the cruise has been improving with leg elevation, denies pain, but pt was concerned Denies any SOB/RIVERA, walked extensively during her vacation, including up steps without any dyspnea  Denies any other symptoms, No chest pain, palpitations, lightheadedness, syncope Compliant with meds, denies any missed Eliquis doses  Reports unsteadiness on her feet, , fell twice on the dance floor this past year, "falls over her own feet", 5-6 falls over the past year , unable to relate the unsteadiness to episodes of LE edema.  denies syncope, can get right up and keep going. Does not use cane or walker. no falls on stairs  9/20/2023 Returns for follow up . has no swelling in bilateral LE, wears compression stockings. 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  she will remain on it lifelong ( ? low protein S)  LDL is better 115 ( from 134) ,  ( from 244)  no exertional chest pain or  SOB, palpitations, dizziness or syncope .  doesn't fall anymore   2/7/2024  Returns for follow up . no exertional chest pain or  SOB, palpitations, dizziness or syncope She had a colonoscopy 2 weeks ago and stopped eliquis for 2 doses prior to colonoscopy  now she needs to have another colonoscopy to remove the other 20 mm polyp wears compression stockings in both LE

## 2024-02-07 NOTE — ASSESSMENT
[FreeTextEntry1] : A/P   72 F with history of HTN, HLD, glaucoma/ cataract , low protein S, submassive saddle PE with RV strain, as well as LLE DVT (femoral, popliteal, posterior tibial, and peroneal) 6/2020: (was enrolled in PE study (randomized to peripheral TPA) - on Eliquis  has low protein S , residual chronic left femoral vein thrombus    1. coninue eliquis, lifelong as per hematolgy  can stop eliquis for 36 hours prior to colonoscopy . resume the night of the colonoscopy if no contraindications from a GI standpoint 2. HTN  controlled on Amlodipine 5 mg daily, 3. Hyperlipidemia:  , will increase Pravastatin to 80 mg daily    f/u in 1 year.

## 2024-02-07 NOTE — PHYSICAL EXAM
[No Carotid Bruit] : no carotid bruit [Normal S1, S2] : normal S1, S2 [No Murmur] : no murmur [No Rub] : no rub [Normal Gait] : normal gait [Normal] : alert and oriented, normal memory [de-identified] : no bilateral LE Edema

## 2024-02-08 ENCOUNTER — NON-APPOINTMENT (OUTPATIENT)
Age: 74
End: 2024-02-08

## 2024-05-02 ENCOUNTER — RX RENEWAL (OUTPATIENT)
Age: 74
End: 2024-05-02

## 2024-05-02 RX ORDER — PRAVASTATIN SODIUM 80 MG/1
80 TABLET ORAL
Qty: 90 | Refills: 3 | Status: ACTIVE | COMMUNITY
Start: 2022-09-14 | End: 1900-01-01

## 2024-07-18 ENCOUNTER — RX RENEWAL (OUTPATIENT)
Age: 74
End: 2024-07-18

## 2024-08-07 ENCOUNTER — APPOINTMENT (OUTPATIENT)
Dept: CARDIOLOGY | Facility: CLINIC | Age: 74
End: 2024-08-07

## 2024-08-07 PROCEDURE — 99213 OFFICE O/P EST LOW 20 MIN: CPT

## 2024-08-07 PROCEDURE — 93000 ELECTROCARDIOGRAM COMPLETE: CPT

## 2024-08-12 NOTE — ED ADULT NURSE NOTE - NEURO ASSESSMENT
08/12/24 1135   Discharge Planning   Living Arrangements Alone   Support Systems Family members   Assistance Needed Independent   Type of Residence Private residence   Home or Post Acute Services None   Expected Discharge Disposition Home   Does the patient need discharge transport arranged? No   Financial Resource Strain   How hard is it for you to pay for the very basics like food, housing, medical care, and heating? Not hard   Housing Stability   In the last 12 months, was there a time when you were not able to pay the mortgage or rent on time? N   In the past 12 months, how many times have you moved where you were living? 0   At any time in the past 12 months, were you homeless or living in a shelter (including now)? N   Transportation Needs   In the past 12 months, has lack of transportation kept you from medical appointments or from getting medications? no   In the past 12 months, has lack of transportation kept you from meetings, work, or from getting things needed for daily living? No   Patient Choice   Patient / Family choosing to utilize agency / facility established prior to hospitalization No     Care transitions assessment completed via bedside with patient. TCC introduced self and explained role. Demographics verified. Patient from home alone.  Independent PTA. Denies use of assistive devices. Denies SW needs at this time. Patient anticipates no skilled needs at discharge. Dispo pending hospital course. POD1 Lap cholecystectomy.  Patient's family to transport home at time of discharge. TCC to continue to follow for discharge planning needs.      PCP: None  Last seen: N/A  Pharmacy: [facility]   Insurance: [employer]  Dispo: Home, no needs     LILI PEREZ RN TCC      - - -

## 2024-09-15 ENCOUNTER — NON-APPOINTMENT (OUTPATIENT)
Age: 74
End: 2024-09-15

## 2025-03-21 NOTE — PROGRESS NOTE ADULT - ASSESSMENT
Pt here for C5D2 Drug(s)Fulphila.  Arrives Ambulating independently, accompanied by Family member     Patient was evaluated today by Treatment Nurse.    Oral medications included in this regimen:  no    Patient confirms comprehension of cancer treatment schedule:  yes    Pregnancy screening:  Not applicable    Modifications in dose or schedule:  No    Medications appearance and physical integrity checked by RN: yes.    Chemotherapy IV pump settings verified by 2 RNs:  n/a - cancer treatment injection administered.  Frequency of blood return and site check throughout administration:     Infusion/treatment outcome:  patient tolerated treatment without incident    Education Record    Learner:  Patient  Barriers / Limitations:  None  Method:  Brief focused  Education / instructions given:  poc  Outcome:  Shows understanding    Discharged Home, Ambulating independently, accompanied by:Family member    Patient/family verbalized understanding of future appointments: by [employer] messaging    Pt discharged in stable condition.  Pt to rtc on Sunday, March 23 for iv fluids.     68 y/o female with PMHx of Obesity, HTN, hyperlipidemia , who presents with syncope and found to have saddle PE and right main PE with lobar extension as well as evidence of RV strain on CT and echo  elevated biomarkers. PESI score of 1 (  at presentation) suggestive of increased risk. Patient had a fall one week ago and had swelling and ecchymosis of left leg  Patient agreed to be part of research and was randomized to peripheral TPA which she received.    Bnp 2033      S/P SYNCOPE DUE TO SADDLE PE  PROVOKED CLOT  left lower ext dvt  c/w eliquis  TTE- [facility] sign, severe RV systolic dysfunction.   follow up in office 30 days- Dr. Saenz or Dr. Au     BREAST ECCHYMOSIS AND SMALL HEMATOMA  will apply ice compresses  self limiting    HYPERLIPIDEMIA   c/w Liptor    Thank you for allowing me to participate in care of your patient.   Please call as needed.

## 2025-06-25 ENCOUNTER — RX RENEWAL (OUTPATIENT)
Age: 75
End: 2025-06-25

## 2025-08-11 ENCOUNTER — APPOINTMENT (OUTPATIENT)
Dept: CARDIOLOGY | Facility: CLINIC | Age: 75
End: 2025-08-11

## 2025-08-12 ENCOUNTER — NON-APPOINTMENT (OUTPATIENT)
Age: 75
End: 2025-08-12

## 2025-08-14 ENCOUNTER — APPOINTMENT (OUTPATIENT)
Dept: CARDIOLOGY | Facility: CLINIC | Age: 75
End: 2025-08-14

## 2025-08-14 VITALS
WEIGHT: 180 LBS | HEART RATE: 56 BPM | SYSTOLIC BLOOD PRESSURE: 137 MMHG | BODY MASS INDEX: 30.73 KG/M2 | OXYGEN SATURATION: 95 % | DIASTOLIC BLOOD PRESSURE: 72 MMHG | HEIGHT: 64 IN

## 2025-09-03 ENCOUNTER — APPOINTMENT (OUTPATIENT)
Dept: CARDIOLOGY | Facility: CLINIC | Age: 75
End: 2025-09-03
Payer: MEDICARE

## 2025-09-03 PROCEDURE — 93306 TTE W/DOPPLER COMPLETE: CPT
